# Patient Record
Sex: MALE | Race: WHITE | NOT HISPANIC OR LATINO | Employment: OTHER | URBAN - METROPOLITAN AREA
[De-identification: names, ages, dates, MRNs, and addresses within clinical notes are randomized per-mention and may not be internally consistent; named-entity substitution may affect disease eponyms.]

---

## 2019-02-13 ENCOUNTER — OFFICE VISIT (OUTPATIENT)
Dept: CARDIOLOGY CLINIC | Facility: CLINIC | Age: 74
End: 2019-02-13
Payer: COMMERCIAL

## 2019-02-13 VITALS
HEART RATE: 83 BPM | WEIGHT: 180.8 LBS | DIASTOLIC BLOOD PRESSURE: 60 MMHG | SYSTOLIC BLOOD PRESSURE: 120 MMHG | HEIGHT: 71 IN | OXYGEN SATURATION: 96 % | BODY MASS INDEX: 25.31 KG/M2

## 2019-02-13 DIAGNOSIS — I10 ESSENTIAL HYPERTENSION, BENIGN: ICD-10-CM

## 2019-02-13 DIAGNOSIS — E78.2 MIXED HYPERLIPIDEMIA: ICD-10-CM

## 2019-02-13 DIAGNOSIS — I25.10 CORONARY ARTERY DISEASE INVOLVING NATIVE HEART WITHOUT ANGINA PECTORIS, UNSPECIFIED VESSEL OR LESION TYPE: Primary | ICD-10-CM

## 2019-02-13 PROCEDURE — 93000 ELECTROCARDIOGRAM COMPLETE: CPT | Performed by: INTERNAL MEDICINE

## 2019-02-13 PROCEDURE — 99214 OFFICE O/P EST MOD 30 MIN: CPT | Performed by: INTERNAL MEDICINE

## 2019-02-13 RX ORDER — ATORVASTATIN CALCIUM 10 MG/1
TABLET, FILM COATED ORAL
COMMUNITY
End: 2019-12-10

## 2019-02-13 RX ORDER — FUROSEMIDE 20 MG/1
TABLET ORAL
COMMUNITY
End: 2019-02-13 | Stop reason: ALTCHOICE

## 2019-02-13 RX ORDER — GABAPENTIN 100 MG/1
CAPSULE ORAL
COMMUNITY
End: 2019-02-13 | Stop reason: ALTCHOICE

## 2019-02-13 RX ORDER — EZETIMIBE 10 MG/1
TABLET ORAL EVERY 24 HOURS
COMMUNITY
Start: 2014-12-10 | End: 2019-02-13 | Stop reason: ALTCHOICE

## 2019-02-13 RX ORDER — ASPIRIN 81 MG/1
TABLET, CHEWABLE ORAL DAILY
COMMUNITY
End: 2019-02-13 | Stop reason: ALTCHOICE

## 2019-02-13 RX ORDER — AMLODIPINE BESYLATE 10 MG/1
TABLET ORAL
COMMUNITY
Start: 2017-06-07

## 2019-02-13 RX ORDER — METOPROLOL SUCCINATE 25 MG/1
TABLET, EXTENDED RELEASE ORAL
COMMUNITY
Start: 2016-12-07 | End: 2021-06-28

## 2019-02-13 RX ORDER — ACETAMINOPHEN 160 MG
TABLET,DISINTEGRATING ORAL EVERY 24 HOURS
COMMUNITY
Start: 2017-06-07 | End: 2019-02-13 | Stop reason: ALTCHOICE

## 2019-02-13 RX ORDER — LORAZEPAM 0.5 MG/1
0.5 TABLET ORAL EVERY 6 HOURS PRN
COMMUNITY
Start: 2019-01-27 | End: 2019-02-13 | Stop reason: ALTCHOICE

## 2019-02-13 RX ORDER — CLOBETASOL PROPIONATE 0.5 MG/G
OINTMENT TOPICAL
COMMUNITY
Start: 2018-04-20

## 2019-02-13 RX ORDER — ASCORBIC ACID 100 MG
TABLET,CHEWABLE ORAL
COMMUNITY
End: 2019-02-13 | Stop reason: ALTCHOICE

## 2019-02-13 RX ORDER — CILOSTAZOL 100 MG/1
TABLET ORAL
COMMUNITY
End: 2019-02-13 | Stop reason: ALTCHOICE

## 2019-02-13 RX ORDER — AMLODIPINE BESYLATE 5 MG/1
TABLET ORAL
COMMUNITY
End: 2019-02-13 | Stop reason: ALTCHOICE

## 2019-02-13 RX ORDER — FERROUS SULFATE 325(65) MG
TABLET ORAL 3 TIMES DAILY
COMMUNITY
End: 2019-09-04 | Stop reason: ALTCHOICE

## 2019-02-13 RX ORDER — CEPHALEXIN 500 MG/1
CAPSULE ORAL
COMMUNITY
End: 2019-02-13 | Stop reason: ALTCHOICE

## 2019-02-13 RX ORDER — GABAPENTIN 300 MG/1
CAPSULE ORAL
COMMUNITY
End: 2019-02-13 | Stop reason: ALTCHOICE

## 2019-02-13 RX ORDER — ATORVASTATIN CALCIUM 20 MG/1
TABLET, FILM COATED ORAL EVERY 24 HOURS
COMMUNITY
Start: 2014-12-11 | End: 2019-09-04 | Stop reason: ALTCHOICE

## 2019-02-13 RX ORDER — CIPROFLOXACIN 500 MG/1
TABLET, FILM COATED ORAL
COMMUNITY
End: 2019-02-13 | Stop reason: ALTCHOICE

## 2019-02-13 RX ORDER — ASPIRIN 81 MG/1
TABLET ORAL EVERY 24 HOURS
COMMUNITY
Start: 2015-12-10 | End: 2019-02-13 | Stop reason: ALTCHOICE

## 2019-02-13 RX ORDER — EZETIMIBE 10 MG/1
TABLET ORAL
COMMUNITY
End: 2019-02-13 | Stop reason: ALTCHOICE

## 2019-02-13 NOTE — PROGRESS NOTES
[facility]   [address] [facility], [place] [address]  Phone#  [phone]  Fax#  [phone]  *-*-*-*-*-*-*-*-*-*-*-*-*-*-*-*-*-*-*-*-*-*-*-*-*-*-*-*-*-*-*-*-*-*-*-*-*-*-*-*-*-*-*-*-*-*-*-*-*-*-*-*-*-*    Peter Jean DATE: 02/13/19 1:00 PM    PATIENT NAME: Sanjuanita Grijalva   1945    [id number]  Age: 75 y o  Sex: male    AUTHOR: Josue Laureano MD  PRIMARYCARE PHYSICIAN: Rosangela Adames MD    DIAGNOSES:  1  History of coronary artery disease status post CABG 2011  2  Abdominal aortic aneurysm repair in 2015  3  Peripheral vascular disease status post aortofemoral bypass in July 2016   4  Benign essential hypertension  5  Mixed dyslipidemia  6  Osteoarthritis and degenerative joint disease  7  History of anemia  8  Peripheral neuropathy  9  History of constipation  10  Non-small cell lung cancer involving right Lung upper lobe, stage IIIA, confirmed in December 2018, currently on a 7 week course of chemo (Taxol and carboplatin) and  radiation  Right hilar lymph node metastasis  11  Right inguinal hernia  Persantine nuclear stress test in December 2014 showed normal perfusion with no evidence of ischemia, EF 62%  Echocardiogram in December 2017 showed mild concentric left ventricular hypertrophy, normal left ventricular systolic function, EF around 91-15%, grade 1 diastolic dysfunction with normal left ventricular filling pressures, trace mitral valve regurgitation, aortic valve sclerosis, mild tricuspid valve regurgitation and no pulmonary hypertension  ECG June 2018 showed sinus rhythm, HR 70 BPM, normal axis and intervals, no significant ST T-wave abnormalities  CURRENT ECG:  Results for orders placed or performed in visit on 02/13/19   POCT ECG    Narrative    Sinus rhythm, HR 83 bpm, normal axis and intervals, no significant ST T wave abnormalities         CARDIOLOGY ASSESSMENT & PLAN:  Coronary artery disease involving native heart without angina pectoris  Known history of coronary artery disease and peripheral artery disease status post CABG and aortofemoral bypass  Blood pressure is well controlled  Continues to smoke  Is on reasonable medical regimen  Is noted to be on chemotherapy with carboplatin and Taxol  Has minor side effects but no significant cardiac defects     - Patient is advised to continue current medical therapy  - Dietary and medical compliance are reinforced  - Advised  to report any concerning symptoms such as chest pain, shortness of breath, decline in exercise tolerance or presyncope/syncope  - chemotherapy agents being used are relatively are infrequently associated with cardiac problems including heart failure, blood clots and strokes have been reported with Carboplatin use  He will need surveillance monitoring from time to time  We will repeat echocardiogram prior to next visit  - I again strongly reinforced him about quitting smoking completely  Essential hypertension, benign  Evaluation and plan as noted above  Mixed hyperlipidemia  Evaluation and plan as noted above  INTERVAL HISTORY & HISTORY OF PRESENT ILLNESS:  Marilynn Bustos is here for follow-up regarding his cardiac comorbidities which include:  History of coronary artery disease, peripheral artery disease and comorbidities  He is here for follow-up  Mentions that since his last visit with us in June 2018 he has been diagnosed with non-small cell lung cancer involving the right lung  Review of his records indicate stage IIIA NSCLA with mild mediastinal med ST cyst   He is being treated with chemotherapy and radiation  His chemotherapy is include Taxol and carboplatin  He has been tolerating the chemotherapy and radiation fairly well  Does have some symptoms including some esophageal discomfort and difficulty swallowing cold foods and hair loss  Denies any typical chest pain, orthopnea, palpitations, presyncope/syncope    Also denies claudication symptoms although he feels leg cramps occasionally  No other recent hospitalizations or other illnesses  He continues to smoke half a pack to a pack a day  REVIEW OF SYMPTOMS:    Positive for:  As described above in HPI  Negative for: All remaining as reviewed below and in HPI  SYSTEM SYMPTOMS REVIEWED:  General--weight change, fever, night sweats  Respirato  Cardiovascular--chest pain, syncope, dyspnea on exertion, edema, decline in exercise tolerance, claudication   Gastrointestinal--persistent vomiting, diarrhea, abdominal distention, blood in stool   Muscular or skeletal--joint pain or swelling   Neurologic--headaches, syncope, abnormal movement  Hematologic--history of easy bruising and bleeding   Endocrine--thyroid enlargement, heat or cold intolerance, polyuria   Psychiatric--anxiety, depression     *-*-*-*-*-*-*-*-*-*-*-*-*-*-*-*-*-*-*-*-*-*-*-*-*-*-*-*-*-*-*-*-*-*-*-*-*-*-*-*-*-*-*-*-*-*-*-*-*-*-*-*-*-*-  VITAL SIGNS:  Vitals:    02/13/19 1144   BP: 120/60   Pulse: 83   SpO2: 96%   Weight: 82 kg (180 lb 12 8 oz)   Height: 5' 11" (1 803 m)       *-*-*-*-*-*-*-*-*-*-*-*-*-*-*-*-*-*-*-*-*-*-*-*-*-*-*-*-*-*-*-*-*-*-*-*-*-*-*-*-*-*-*-*-*-*-*-*-*-*-*-*-*-*-  PHYSICAL EXAM:  General Appearance:    Alert, cooperative, no distress, appears stated age   Head, Eyes, ENT:    No obvious abnormality, moist mucous mebranes  Neck:   Supple, no carotid bruit or JVD   Back:     Symmetric, no curvature  Lungs:     Respirations unlabored  Clear to auscultation bilaterally,    Chest wall:    No tenderness or deformity   Heart:    Regular rate and rhythm, S1 and S2 normal, no murmur, rub  or gallop     Abdomen:     Soft, non-tender, No obvious masses, or organomegaly   Extremities:   Extremities normal, no cyanosis or edema    Skin:   Skin color, texture, turgor normal, no rashes or lesions     *-*-*-*-*-*-*-*-*-*-*-*-*-*-*-*-*-*-*-*-*-*-*-*-*-*-*-*-*-*-*-*-*-*-*-*-*-*-*-*-*-*-*-*-*-*-*-*-*-*-*-*-*-*-  CURRENT MEDICATION LIST:    Current Outpatient Medications:     amLODIPine (NORVASC) 10 mg tablet, TAKE ONE TABLET BY MOUTH EVERY DAY, Disp: , Rfl:     atorvastatin (LIPITOR) 10 mg tablet, atorvastatin 10 mg tablet, Disp: , Rfl:     atorvastatin (LIPITOR) 20 mg tablet, every 24 hours, Disp: , Rfl:     clobetasol (TEMOVATE) 0 05 % ointment, Apply to affected area daily as needed, Disp: , Rfl:     ferrous sulfate 325 (65 Fe) mg tablet, 3 (three) times a day, Disp: , Rfl:     metoprolol succinate (TOPROL-XL) 25 mg 24 hr tablet, metoprolol succinate ER 25 mg tablet,extended release 24 hr, Disp: , Rfl:     Multiple Vitamins-Minerals (MULTIVITAMIN ADULT EXTRA C PO), Take 1 capsule by mouth, Disp: , Rfl:     ALLERGIES:  Allergies   Allergen Reactions    Ciprofloxacin      Dark stool , upset stomach    Duloxetine Other (See Comments)     Cramps    Meloxicam      Other reaction(s): itchy, blotchy, stomach upset    Nifedipine     Pentoxifylline      Dark stool, upset stomach    Tramadol Other (See Comments)     Ineffective          *-*-*-*-*-*-*-*-*-*-*-*-*-*-*-*-*-*-*-*-*-*-*-*-*-*-*-*-*-*-*-*-*-*-*-*-*-*-*-*-*-*-*-*-*-*-*-*-*-*-*-*-*-*-    LABORATORY DATA:  I have personally reviewed pertinent labs  Recent blood work from [facility] is reviewed  It is significant for hemoglobin of 10 5, hematocrit of 30 3, platelet 151, normal renal function and electrolytes, low albumin      No results found for: NA, K, CL, CO2, ANIONGAP, BUN, CREATININE, EGFR, GLUCOSE, CALCIUM, AST, ALT, ALKPHOS, PROT, BILITOT, MG  No results found for: WBC, HGB, PLT  No results found for: PT, PTT, INR  No results found for: CKMB, BNP, DIGOXIN  No results found for: TSH  No results found for: CHOL, HDL, LDL, TRIG   No results found for: HGBA1C  No results found for: Sonia Rm, GRAMSTAIN, URINECX, WOUNDCULT, BODYFLUIDCUL, MRSACULTURE, INFLUAPCR, INFLUBPCR, RSVPCR, LEGIONELLAUR, CDIFFTOXINB    *-*-*-*-*-*-*-*-*-*-*-*-*-*-*-*-*-*-*-*-*-*-*-*-*-*-*-*-*-*-*-*-*-*-*-*-*-*-*-*-*-*-*-*-*-*-*-*-*-*-*-*-*-*-  PREVIOUS CARDIOLOGY & RADIOLOGY RESULTS:  No results found for this or any previous visit  No results found for this or any previous visit  No results found for this or any previous visit  No results found for this or any previous visit  Patient was never admitted       *-*-*-*-*-*-*-*-*-*-*-*-*-*-*-*-*-*-*-*-*-*-*-*-*-*-*-*-*-*-*-*-*-*-*-*-*-*-*-*-*-*-*-*-*-*-*-*-*-*-*-*-*-*-  SIGNATURES:   @XUH@   Josue Laureano MD     CC:   Sly Luque MD   Self, Referral   *-*-*-*-*-*-*-*-*-*-*-*-*-*-*-*-*-*-*-*-*-*-*-*-*-*-*-*-*-*-*-*-*-*-*-*-*-*-*-*-*-*-*-*-*-*-*-*-*-*-*-*-*-*-    Social History     Socioeconomic History    Marital status: /Civil Union     Spouse name: Not on file    Number of children: Not on file    Years of education: Not on file    Highest education level: Not on file   Occupational History    Not on file   Social Needs    Financial resource strain: Not on file    Food insecurity:     Worry: Not on file     Inability: Not on file    Transportation needs:     Medical: Not on file     Non-medical: Not on file   Tobacco Use    Smoking status: Current Some Day Smoker     Types: Cigarettes    Smokeless tobacco: Never Used    Tobacco comment: passive smoke exposure   Substance and Sexual Activity    Alcohol use: Not on file    Drug use: Not on file    Sexual activity: Not on file   Lifestyle    Physical activity:     Days per week: Not on file     Minutes per session: Not on file    Stress: Not on file   Relationships    Social connections:     Talks on phone: Not on file     Gets together: Not on file     Attends Christian service: Not on file     Active member of club or organization: Not on file     Attends meetings of clubs or organizations: Not on file     Relationship status: Not on file    Intimate partner violence:     Fear of current or ex partner: Not on file     Emotionally abused: Not on file     Physically abused: Not on file     Forced sexual activity: Not on file   Other Topics Concern    Not on file   Social History Narrative    Not on file      Family History   Problem Relation Age of Onset    Aortic aneurysm Mother     Coronary artery disease Mother      Past Surgical History:   Procedure Laterality Date    FEMORAL BYPASS

## 2019-02-13 NOTE — ASSESSMENT & PLAN NOTE
Known history of coronary artery disease and peripheral artery disease status post CABG and aortofemoral bypass  Blood pressure is well controlled  Continues to smoke  Is on reasonable medical regimen  Is noted to be on chemotherapy with carboplatin and Taxol  Has minor side effects but no significant cardiac defects     - Patient is advised to continue current medical therapy  - Dietary and medical compliance are reinforced  - Advised  to report any concerning symptoms such as chest pain, shortness of breath, decline in exercise tolerance or presyncope/syncope  - chemotherapy agents being used are relatively are infrequently associated with cardiac problems including heart failure, blood clots and strokes have been reported with Carboplatin use  He will need surveillance monitoring from time to time  We will repeat echocardiogram prior to next visit  - I again strongly reinforced him about quitting smoking completely

## 2019-02-13 NOTE — LETTER
February 13, 2019     Referral [facility]    Patient: Lia Larry   YOB: 1945   Date of Visit: 2/13/2019       Dear Dr Manpreet Evans:    Thank you for referring Lia Larry to me for evaluation  Below are my notes for this consultation  If you have questions, please do not hesitate to call me  I look forward to following your patient along with you  Sincerely,        Josue Laureano MD        CC: MD Vickie Traylor MD Ather Mansoor, MD  2/13/2019 12:56 PM  Sign at close encounter   [facility]   [address] [place] [address], [place] [address]  Phone#  [phone]  Fax#  [phone]  *-*-*-*-*-*-*-*-*-*-*-*-*-*-*-*-*-*-*-*-*-*-*-*-*-*-*-*-*-*-*-*-*-*-*-*-*-*-*-*-*-*-*-*-*-*-*-*-*-*-*-*-*-*    Rebeka Bradleyady DATE: 02/13/19 12:56 PM    PATIENT NAME: Miguelina Grijalva   1945    [id number]  Age: 75 y o  Sex: male    AUTHOR: Josue Laureano MD  PRIMARYCARE PHYSICIAN: Zee Bliss MD    DIAGNOSES:  1  History of coronary artery disease status post CABG 2011  2  Abdominal aortic aneurysm repair in 2015  3  Peripheral vascular disease status post aortofemoral bypass in July 2016   4  Benign essential hypertension  5  Mixed dyslipidemia  6  Osteoarthritis and degenerative joint disease  7  History of anemia  8  Peripheral neuropathy  9  History of constipation  10  Non-small cell lung cancer involving right Lung upper lobe, stage IIIA, confirmed in December 2018, currently on a 7 week course of chemo (Taxol and carboplatin) and  radiation  Right hilar lymph node metastasis  11  Right inguinal hernia  Persantine nuclear stress test in December 2014 showed normal perfusion with no evidence of ischemia, EF 62%       Echocardiogram in December 2017 showed mild concentric left ventricular hypertrophy, normal left ventricular systolic function, EF around 61-19%, grade 1 diastolic dysfunction with normal left ventricular filling pressures, trace mitral valve regurgitation, aortic valve sclerosis, mild tricuspid valve regurgitation and no pulmonary hypertension  ECG June 2018 showed sinus rhythm, HR 70 BPM, normal axis and intervals, no significant ST T-wave abnormalities  CURRENT ECG:  No results found for this visit on 02/13/19  CARDIOLOGY ASSESSMENT & PLAN:  Coronary artery disease involving native heart without angina pectoris  Known history of coronary artery disease and peripheral artery disease status post CABG and aortofemoral bypass  Blood pressure is well controlled  Continues to smoke  Is on reasonable medical regimen  Is noted to be on chemotherapy with carboplatin and Taxol  Has minor side effects but no significant cardiac defects     - Patient is advised to continue current medical therapy  - Dietary and medical compliance are reinforced  - Advised  to report any concerning symptoms such as chest pain, shortness of breath, decline in exercise tolerance or presyncope/syncope  - chemotherapy agents being used are relatively are infrequently associated with cardiac problems including heart failure, blood clots and strokes have been reported with Carboplatin use  He will need surveillance monitoring from time to time  We will repeat echocardiogram prior to next visit  - I again strongly reinforced him about quitting smoking completely  Essential hypertension, benign  Evaluation and plan as noted above  Mixed hyperlipidemia  Evaluation and plan as noted above  INTERVAL HISTORY & HISTORY OF PRESENT ILLNESS:  Brandt Oleary is here for follow-up regarding his cardiac comorbidities which include:  History of coronary artery disease, peripheral artery disease and comorbidities  He is here for follow-up  Mentions that since his last visit with us in June 2018 he has been diagnosed with non-small cell lung cancer involving the right lung    Review of his records indicate stage IIIA NSCLA with mild mediastinal med ST cyst   He is being treated with chemotherapy and radiation  His chemotherapy is include Taxol and carboplatin  He has been tolerating the chemotherapy and radiation fairly well  Does have some symptoms including some esophageal discomfort and difficulty swallowing cold foods and hair loss  Denies any typical chest pain, orthopnea, palpitations, presyncope/syncope  Also denies claudication symptoms although he feels leg cramps occasionally  No other recent hospitalizations or other illnesses  He continues to smoke half a pack to a pack a day  REVIEW OF SYMPTOMS:    Positive for:  As described above in HPI  Negative for: All remaining as reviewed below and in HPI  SYSTEM SYMPTOMS REVIEWED:  Generalweight change, fever, night sweats  Respirato  Cardiovascularchest pain, syncope, dyspnea on exertion, edema, decline in exercise tolerance, claudication   Gastrointestinalpersistent vomiting, diarrhea, abdominal distention, blood in stool   Muscular or skeletaljoint pain or swelling   Neurologicheadaches, syncope, abnormal movement  Hematologichistory of easy bruising and bleeding   Endocrinethyroid enlargement, heat or cold intolerance, polyuria   Psychiatricanxiety, depression     *-*-*-*-*-*-*-*-*-*-*-*-*-*-*-*-*-*-*-*-*-*-*-*-*-*-*-*-*-*-*-*-*-*-*-*-*-*-*-*-*-*-*-*-*-*-*-*-*-*-*-*-*-*-  VITAL SIGNS:  Vitals:    02/13/19 1144   BP: 120/60   Pulse: 83   SpO2: 96%   Weight: 82 kg (180 lb 12 8 oz)   Height: 5' 11" (1 803 m)       *-*-*-*-*-*-*-*-*-*-*-*-*-*-*-*-*-*-*-*-*-*-*-*-*-*-*-*-*-*-*-*-*-*-*-*-*-*-*-*-*-*-*-*-*-*-*-*-*-*-*-*-*-*-  PHYSICAL EXAM:  General Appearance:    Alert, cooperative, no distress, appears stated age   Head, Eyes, ENT:    No obvious abnormality, moist mucous mebranes  Neck:   Supple, no carotid bruit or JVD   Back:     Symmetric, no curvature  Lungs:     Respirations unlabored   Clear to auscultation bilaterally,    Chest wall:    No tenderness or deformity   Heart: Regular rate and rhythm, S1 and S2 normal, no murmur, rub  or gallop  Abdomen:     Soft, non-tender, No obvious masses, or organomegaly   Extremities:   Extremities normal, no cyanosis or edema    Skin:   Skin color, texture, turgor normal, no rashes or lesions     *-*-*-*-*-*-*-*-*-*-*-*-*-*-*-*-*-*-*-*-*-*-*-*-*-*-*-*-*-*-*-*-*-*-*-*-*-*-*-*-*-*-*-*-*-*-*-*-*-*-*-*-*-*-  CURRENT MEDICATION LIST:    Current Outpatient Medications:     amLODIPine (NORVASC) 10 mg tablet, TAKE ONE TABLET BY MOUTH EVERY DAY, Disp: , Rfl:     atorvastatin (LIPITOR) 10 mg tablet, atorvastatin 10 mg tablet, Disp: , Rfl:     atorvastatin (LIPITOR) 20 mg tablet, every 24 hours, Disp: , Rfl:     clobetasol (TEMOVATE) 0 05 % ointment, Apply to affected area daily as needed, Disp: , Rfl:     ferrous sulfate 325 (65 Fe) mg tablet, 3 (three) times a day, Disp: , Rfl:     metoprolol succinate (TOPROL-XL) 25 mg 24 hr tablet, metoprolol succinate ER 25 mg tablet,extended release 24 hr, Disp: , Rfl:     Multiple Vitamins-Minerals (MULTIVITAMIN ADULT EXTRA C PO), Take 1 capsule by mouth, Disp: , Rfl:     ALLERGIES:  Allergies   Allergen Reactions    Ciprofloxacin      Dark stool , upset stomach    Duloxetine Other (See Comments)     Cramps    Meloxicam      Other reaction(s): itchy, blotchy, stomach upset    Nifedipine     Pentoxifylline      Dark stool, upset stomach    Tramadol Other (See Comments)     Ineffective          *-*-*-*-*-*-*-*-*-*-*-*-*-*-*-*-*-*-*-*-*-*-*-*-*-*-*-*-*-*-*-*-*-*-*-*-*-*-*-*-*-*-*-*-*-*-*-*-*-*-*-*-*-*-    LABORATORY DATA:  I have personally reviewed pertinent labs  Recent blood work from [facility] is reviewed  It is significant for hemoglobin of 10 5, hematocrit of 30 3, platelet 507, normal renal function and electrolytes, low albumin      No results found for: NA, K, CL, CO2, ANIONGAP, BUN, CREATININE, EGFR, GLUCOSE, CALCIUM, AST, ALT, ALKPHOS, PROT, BILITOT, MG  No results found for: WBC, HGB, PLT  No results found for: PT, PTT, INR  No results found for: CKMB, BNP, DIGOXIN  No results found for: TSH  No results found for: CHOL, HDL, LDL, TRIG   No results found for: HGBA1C  No results found for: BLOODCX, SPUTUMCULTUR, GRAMSTAIN, URINECX, WOUNDCULT, BODYFLUIDCUL, MRSACULTURE, INFLUAPCR, INFLUBPCR, RSVPCR, LEGIONELLAUR, CDIFFTOXINB    *-*-*-*-*-*-*-*-*-*-*-*-*-*-*-*-*-*-*-*-*-*-*-*-*-*-*-*-*-*-*-*-*-*-*-*-*-*-*-*-*-*-*-*-*-*-*-*-*-*-*-*-*-*-  PREVIOUS CARDIOLOGY & RADIOLOGY RESULTS:  No results found for this or any previous visit  No results found for this or any previous visit  No results found for this or any previous visit  No results found for this or any previous visit  Patient was never admitted       *-*-*-*-*-*-*-*-*-*-*-*-*-*-*-*-*-*-*-*-*-*-*-*-*-*-*-*-*-*-*-*-*-*-*-*-*-*-*-*-*-*-*-*-*-*-*-*-*-*-*-*-*-*-  SIGNATURES:   @OPZ@   Josue Laureano MD     CC:   Jourdan Ojeda MD   Self, Referral   *-*-*-*-*-*-*-*-*-*-*-*-*-*-*-*-*-*-*-*-*-*-*-*-*-*-*-*-*-*-*-*-*-*-*-*-*-*-*-*-*-*-*-*-*-*-*-*-*-*-*-*-*-*-    Social History     Socioeconomic History    Marital status: /Civil Union     Spouse name: Not on file    Number of children: Not on file    Years of education: Not on file    Highest education level: Not on file   Occupational History    Not on file   Social Needs    Financial resource strain: Not on file    Food insecurity:     Worry: Not on file     Inability: Not on file    Transportation needs:     Medical: Not on file     Non-medical: Not on file   Tobacco Use    Smoking status: Current Some Day Smoker     Types: Cigarettes    Smokeless tobacco: Never Used    Tobacco comment: passive smoke exposure   Substance and Sexual Activity    Alcohol use: Not on file    Drug use: Not on file    Sexual activity: Not on file   Lifestyle    Physical activity:     Days per week: Not on file     Minutes per session: Not on file    Stress: Not on file   Relationships    Social connections:     Talks on phone: Not on file     Gets together: Not on file     Attends Sikhism service: Not on file     Active member of club or organization: Not on file     Attends meetings of clubs or organizations: Not on file     Relationship status: Not on file    Intimate partner violence:     Fear of current or ex partner: Not on file     Emotionally abused: Not on file     Physically abused: Not on file     Forced sexual activity: Not on file   Other Topics Concern    Not on file   Social History Narrative    Not on file      Family History   Problem Relation Age of Onset    Aortic aneurysm Mother     Coronary artery disease Mother      Past Surgical History:   Procedure Laterality Date    FEMORAL BYPASS

## 2019-02-13 NOTE — PATIENT INSTRUCTIONS
CARDIOLOGY ASSESSMENT & PLAN:  Coronary artery disease involving native heart without angina pectoris  Known history of coronary artery disease and peripheral artery disease status post CABG and aortofemoral bypass  Blood pressure is well controlled  Continues to smoke  Is on reasonable medical regimen  Is noted to be on chemotherapy with carboplatin and Taxol  Has minor side effects but no significant cardiac defects     - Patient is advised to continue current medical therapy  - Dietary and medical compliance are reinforced  - Advised  to report any concerning symptoms such as chest pain, shortness of breath, decline in exercise tolerance or presyncope/syncope  - chemotherapy agents being used are relatively are infrequently associated with cardiac problems including heart failure, blood clots and strokes have been reported with Carboplatin use  He will need surveillance monitoring from time to time  We will repeat echocardiogram prior to next visit  - I again strongly reinforced him about quitting smoking completely  Essential hypertension, benign  Evaluation and plan as noted above  Mixed hyperlipidemia  Evaluation and plan as noted above  DIAGNOSES:  1  History of coronary artery disease status post CABG 2011  2  Abdominal aortic aneurysm repair in 2015  3  Peripheral vascular disease status post aortofemoral bypass in July 2016   4  Benign essential hypertension  5  Mixed dyslipidemia  6  Osteoarthritis and degenerative joint disease  7  History of anemia  8  Peripheral neuropathy  9  History of constipation  10  Non-small cell lung cancer involving right Lung upper lobe, stage IIIA, confirmed in December 2018, currently on a 7 week course of chemo (Taxol and carboplatin) and  radiation  Right hilar lymph node metastasis    11  Right inguinal hernia       Persantine nuclear stress test in December 2014 showed normal perfusion with no evidence of ischemia, EF 62%       Echocardiogram in December 2017 showed mild concentric left ventricular hypertrophy, normal left ventricular systolic function, EF around 10-95%, grade 1 diastolic dysfunction with normal left ventricular filling pressures, trace mitral valve regurgitation, aortic valve sclerosis, mild tricuspid valve regurgitation and no pulmonary hypertension      ECG June 2018 showed sinus rhythm, HR 70 BPM, normal axis and intervals, no significant ST T-wave abnormalities

## 2019-09-04 ENCOUNTER — OFFICE VISIT (OUTPATIENT)
Dept: CARDIOLOGY CLINIC | Facility: CLINIC | Age: 74
End: 2019-09-04
Payer: COMMERCIAL

## 2019-09-04 VITALS
HEIGHT: 71 IN | DIASTOLIC BLOOD PRESSURE: 60 MMHG | WEIGHT: 169 LBS | BODY MASS INDEX: 23.66 KG/M2 | OXYGEN SATURATION: 97 % | SYSTOLIC BLOOD PRESSURE: 120 MMHG | HEART RATE: 74 BPM

## 2019-09-04 DIAGNOSIS — I25.10 CORONARY ARTERY DISEASE INVOLVING NATIVE CORONARY ARTERY OF NATIVE HEART WITHOUT ANGINA PECTORIS: Primary | ICD-10-CM

## 2019-09-04 DIAGNOSIS — I10 ESSENTIAL HYPERTENSION, BENIGN: ICD-10-CM

## 2019-09-04 DIAGNOSIS — Z01.810 PRE-OPERATIVE CARDIOVASCULAR EXAMINATION: ICD-10-CM

## 2019-09-04 PROCEDURE — 99214 OFFICE O/P EST MOD 30 MIN: CPT | Performed by: INTERNAL MEDICINE

## 2019-09-04 PROCEDURE — 3074F SYST BP LT 130 MM HG: CPT | Performed by: INTERNAL MEDICINE

## 2019-09-04 PROCEDURE — 3078F DIAST BP <80 MM HG: CPT | Performed by: INTERNAL MEDICINE

## 2019-09-04 RX ORDER — DESOXIMETASONE 2.5 MG/G
CREAM TOPICAL 2 TIMES DAILY
COMMUNITY
End: 2021-06-28

## 2019-09-04 NOTE — PATIENT INSTRUCTIONS
CARDIOLOGY ASSESSMENT & PLAN:  1  Coronary artery disease involving native coronary artery of native heart without angina pectoris     2  Essential hypertension, benign     3  Pre-operative cardiovascular examination       Coronary artery disease involving native heart without angina pectoris  History of coronary artery disease with remote history of CABG  No recent symptoms of angina  However he continues to smoke and is not interested in smoking cessation  Blood pressure is well controlled  - Patient is advised to continue current medical therapy  - Dietary and medical compliance are reinforced  - Advised  to report any concerning symptoms such as chest pain, shortness of breath, decline in exercise tolerance or presyncope/syncope  - reinforced the importance of quitting smoking and offered to help if he is agreeable  Essential hypertension, benign  Blood pressure is well controlled on current regimen  Will continue current therapy  Pre-operative cardiovascular examination  Is being considered for hernia repair surgery, a procedure with low inherent cardiac risk  Appears to be well compensated with no signs and symptoms of heart failure  No recent angina  Physical examination is no suggests significant valvular heart disease  Recent ECG overall unremarkable  Exercise tolerance is fair  May proceed with planned surgery with low cardiac risk  Does have history of lung cancer and radiation treatment and will need close oxygenation and hemodynamic monitoring during the procedure  DIAGNOSES:  1  History of coronary artery disease status post CABG 2011  2  Abdominal aortic aneurysm repair in 2015  3  Peripheral vascular disease status post aortofemoral bypass in July 2016   4  Benign essential hypertension  5  Mixed dyslipidemia  6  Osteoarthritis and degenerative joint disease  7  History of anemia  8  Peripheral neuropathy  9  History of constipation  10   Non-small cell lung cancer involving right Lung upper lobe, stage IIIA, confirmed in December 2018, completed 7 week course of chemo (Taxol and carboplatin) and radiation- March 2019  Right hilar lymph node metastasis  11  Right inguinal hernia  Persantine nuclear stress test in December 2014 showed normal perfusion with no evidence of ischemia, EF 62%  Echocardiogram in December 2017 showed mild concentric left ventricular hypertrophy, normal left ventricular systolic function, EF around 23-93%, grade 1 diastolic dysfunction with normal left ventricular filling pressures, trace mitral valve regurgitation, aortic valve sclerosis, mild tricuspid valve regurgitation and no pulmonary hypertension  ECG June 2018 showed sinus rhythm, HR 70 BPM, normal axis and intervals, no significant ST T-wave abnormalities

## 2019-09-04 NOTE — ASSESSMENT & PLAN NOTE
Is being considered for hernia repair surgery, a procedure with low inherent cardiac risk  Appears to be well compensated with no signs and symptoms of heart failure  No recent angina  Physical examination is no suggests significant valvular heart disease  Recent ECG overall unremarkable  Exercise tolerance is fair  May proceed with planned surgery with low cardiac risk  Does have history of lung cancer and radiation treatment and will need close oxygenation and hemodynamic monitoring during the procedure

## 2019-09-04 NOTE — ASSESSMENT & PLAN NOTE
History of coronary artery disease with remote history of CABG  No recent symptoms of angina  However he continues to smoke and is not interested in smoking cessation  Blood pressure is well controlled  - Patient is advised to continue current medical therapy  - Dietary and medical compliance are reinforced  - Advised  to report any concerning symptoms such as chest pain, shortness of breath, decline in exercise tolerance or presyncope/syncope  - reinforced the importance of quitting smoking and offered to help if he is agreeable

## 2019-09-04 NOTE — PROGRESS NOTES
[facility]  [address] [facility], [place] [address]  Phone#  [phone]  Fax#  [phone]  *-*-*-*-*-*-*-*-*-*-*-*-*-*-*-*-*-*-*-*-*-*-*-*-*-*-*-*-*-*-*-*-*-*-*-*-*-*-*-*-*-*-*-*-*-*-*-*-*-*-*-*-*-*  Chevy Spence DATE: 09/04/19 3:26 PM  PATIENT NAME: Elbert Grijalva   1945    [id number]  Age: 75 y o  Sex: male  AUTHOR: Josue Laureano MD  PRIMARYCARE PHYSICIAN: Caro Porter MD    DIAGNOSES:  1  History of coronary artery disease status post CABG 2011  2  Abdominal aortic aneurysm repair in 2015  3  Peripheral vascular disease status post aortofemoral bypass in July 2016   4  Benign essential hypertension  5  Mixed dyslipidemia  6  Osteoarthritis and degenerative joint disease  7  History of anemia  8  Peripheral neuropathy  9  History of constipation  10  Non-small cell lung cancer involving right Lung upper lobe, stage IIIA, confirmed in December 2018, completed 7 week course of chemo (Taxol and carboplatin) and radiation- March 2019  Right hilar lymph node metastasis  11  Right inguinal hernia  Persantine nuclear stress test in December 2014 showed normal perfusion with no evidence of ischemia, EF 62%  Echocardiogram in December 2017 showed mild concentric left ventricular hypertrophy, normal left ventricular systolic function, EF around 16-69%, grade 1 diastolic dysfunction with normal left ventricular filling pressures, trace mitral valve regurgitation, aortic valve sclerosis, mild tricuspid valve regurgitation and no pulmonary hypertension  ECG June 2018 showed sinus rhythm, HR 70 BPM, normal axis and intervals, no significant ST T-wave abnormalities  CURRENT ECG:  No results found for this visit on 09/04/19  ECG performed recently on 08/20 2/2019 at [facility] indicates normal sinus rhythm, HR 69 beats per minute, normal axis and intervals, no significant ST T-wave abnormalities  CARDIOLOGY ASSESSMENT & PLAN:  1   Coronary artery disease involving native coronary artery of native heart without angina pectoris     2  Essential hypertension, benign     3  Pre-operative cardiovascular examination       Coronary artery disease involving native heart without angina pectoris  History of coronary artery disease with remote history of CABG  No recent symptoms of angina  However he continues to smoke and is not interested in smoking cessation  Blood pressure is well controlled  - Patient is advised to continue current medical therapy  - Dietary and medical compliance are reinforced  - Advised  to report any concerning symptoms such as chest pain, shortness of breath, decline in exercise tolerance or presyncope/syncope  - reinforced the importance of quitting smoking and offered to help if he is agreeable  Essential hypertension, benign  Blood pressure is well controlled on current regimen  Will continue current therapy  Pre-operative cardiovascular examination  Is being considered for hernia repair surgery, a procedure with low inherent cardiac risk  Appears to be well compensated with no signs and symptoms of heart failure  No recent angina  Physical examination is no suggests significant valvular heart disease  Recent ECG overall unremarkable  Exercise tolerance is fair  May proceed with planned surgery with low cardiac risk  Does have history of lung cancer and radiation treatment and will need close oxygenation and hemodynamic monitoring during the procedure  INTERVAL HISTORY & HISTORY OF PRESENT ILLNESS:  Kirsten Lesches is here for follow-up regarding his cardiac comorbidities which include:  History of coronary artery disease status post CABG, hypertension, peripheral artery disease and other comorbidities  He is here for follow-up  He reports that he has been overall feeling well from cardiac perspective    He he is scheduled to undergo bilateral inguinal hernia repair surgery at [facility] later this month and is requesting cardiac clearance  From a symptom perspective he has been overall all right  He mentions that he is in remission from his cancer and finished chemotherapy  He is going to be placed on immuno therapy following his hernia repair surgery  He denies unusual chest pain, shortness of breath, palpitations, orthopnea, PND or pedal edema  He does mention of having intermittent cough  Has had no recent hospitalizations or other illnesses  Continues to smoke  Continues to experience symptoms of claudication  REVIEW OF SYMPTOMS:    Positive for:  Intermittent cough, some dysphagia which is better now, claudication symptoms  Negative for: All remaining as reviewed below and in HPI  SYSTEM SYMPTOMS REVIEWED:  General--weight change, fever, night sweats  Respiratory--cough, wheezing, shortness of breath, sputum production  Cardiovascular--chest pain, syncope, dyspnea on exertion, edema, decline in exercise tolerance, claudication   Gastrointestinal--persistent vomiting, diarrhea, abdominal distention, blood in stool   Muscular or skeletal--joint pain or swelling   Neurologic--headaches, syncope, abnormal movement  Hematologic--history of easy bruising and bleeding   Endocrine--thyroid enlargement, heat or cold intolerance, polyuria   Psychiatric--anxiety, depression     *-*-*-*-*-*-*-*-*-*-*-*-*-*-*-*-*-*-*-*-*-*-*-*-*-*-*-*-*-*-*-*-*-*-*-*-*-*-*-*-*-*-*-*-*-*-*-*-*-*-*-*-*-*-  VITAL SIGNS:  Vitals:    09/04/19 1455   BP: 120/60   Pulse: 74   SpO2: 97%   Weight: 76 7 kg (169 lb)   Height: 5' 11" (1 803 m)     Weight is down by 11 lb compared to last visit in February 2019  *-*-*-*-*-*-*-*-*-*-*-*-*-*-*-*-*-*-*-*-*-*-*-*-*-*-*-*-*-*-*-*-*-*-*-*-*-*-*-*-*-*-*-*-*-*-*-*-*-*-*-*-*-*-  PHYSICAL EXAM:  General Appearance:    Alert, cooperative, no distress, appears stated age   Head, Eyes, ENT:    No obvious abnormality, moist mucous mebranes     Neck:   Supple, no carotid bruit or JVD   Back: Symmetric, no curvature  Lungs:     Respirations unlabored  breath sounds are coarse bilaterally with bronchial breath and expiratory rhonchi  Chest wall:     has chemotherapy port in left subclavian region   Heart:    Regular rate and rhythm, S1 and S2 normal, no murmur, rub  or gallop  Abdomen:     Soft, non-tender, No obvious masses, or organomegaly   Extremities:   Extremities normal, no cyanosis or edema    Skin:   Skin color, texture, turgor normal, no rashes or lesions     *-*-*-*-*-*-*-*-*-*-*-*-*-*-*-*-*-*-*-*-*-*-*-*-*-*-*-*-*-*-*-*-*-*-*-*-*-*-*-*-*-*-*-*-*-*-*-*-*-*-*-*-*-*-  CURRENT MEDICATION LIST:    Current Outpatient Medications:     amLODIPine (NORVASC) 10 mg tablet, TAKE ONE TABLET BY MOUTH EVERY DAY, Disp: , Rfl:     atorvastatin (LIPITOR) 10 mg tablet, atorvastatin 10 mg tablet, Disp: , Rfl:     clobetasol (TEMOVATE) 0 05 % ointment, Apply to affected area daily as needed, Disp: , Rfl:     desoximetasone (TOPICORT) 0 25 % cream, Apply topically 2 (two) times a day, Disp: , Rfl:     metoprolol succinate (TOPROL-XL) 25 mg 24 hr tablet, metoprolol succinate ER 25 mg tablet,extended release 24 hr, Disp: , Rfl:     Multiple Vitamins-Minerals (MULTIVITAMIN ADULT EXTRA C PO), Take 1 capsule by mouth, Disp: , Rfl:     ALLERGIES:  Allergies   Allergen Reactions    Ciprofloxacin      Dark stool , upset stomach    Duloxetine Other (See Comments)     Cramps    Meloxicam      Other reaction(s): itchy, blotchy, stomach upset    Nifedipine     Pentoxifylline      Dark stool, upset stomach    Tramadol Other (See Comments)     Ineffective          *-*-*-*-*-*-*-*-*-*-*-*-*-*-*-*-*-*-*-*-*-*-*-*-*-*-*-*-*-*-*-*-*-*-*-*-*-*-*-*-*-*-*-*-*-*-*-*-*-*-*-*-*-*-  The LABORATORY DATA:  I have personally reviewed pertinent labs            No results found for: K, CL, CO2, ANIONGAP, BUN, CREATININE, EGFR, GLUCOSE, CALCIUM, AST, ALT, ALKPHOS, PROT, BILITOT, MG  No results found for: WBC, HGB, PLT  No results found for: PT, PTT, INR  No results found for: CKMB, DIGOXIN  No results found for: TSH  No results found for: CHOL, HDL, LDL, TRIG   No results found for: HGBA1C  No results found for: Charlaine Spring, GRAMSTAIN, URINECX, WOUNDCULT, BODYFLUIDCUL, MRSACULTURE, INFLUAPCR, INFLUBPCR, RSVPCR, LEGIONELLAUR, CDIFFTOXINB    *-*-*-*-*-*-*-*-*-*-*-*-*-*-*-*-*-*-*-*-*-*-*-*-*-*-*-*-*-*-*-*-*-*-*-*-*-*-*-*-*-*-*-*-*-*-*-*-*-*-*-*-*-*-  PREVIOUS CARDIOLOGY & RADIOLOGY RESULTS:  No results found for this or any previous visit  No results found for this or any previous visit  No results found for this or any previous visit  No results found for this or any previous visit    MRA runoff  1 3 46 314010 11 14818 5 0 1508 [id number]  CT outside images  2 16 840 1 863522 1 11 [id number]  MRA runoff  1 3 46 547566 11 15862 5 0 15023 [id number]  CT outside images  2 16 840 1 327326 1 11 [id number]  XR chest portable  2 16 840 1 182309 1 11 [id number]  XR chest portable  1 79 837 9 010557 9 66 [id number]  US abdominal aorta  2 16 840 1 860628 1 11 [id number]  NM cardiac blood pool muga (at rest)  2 16 840 1 181212 1 11 [id number]  US abdominal aorta  2 16 840 1 878935 1 11 [id number]  US abdominal aorta  2 16 840 1 751995 1 11 [id number]  US abdominal aorta  2 16 840 1 166711 1 11 [id number]  US abdominal aorta  2 16 840 1 185601 1 11 [id number]  XR chest pa & lateral  2 16 840 1 193078 1 11 [id number]  US abdominal aorta  2 16 840 1 408357 1 11 [id number]        *-*-*-*-*-*-*-*-*-*-*-*-*-*-*-*-*-*-*-*-*-*-*-*-*-*-*-*-*-*-*-*-*-*-*-*-*-*-*-*-*-*-*-*-*-*-*-*-*-*-*-*-*-*-  SIGNATURES:   @LIZ@   Josue Laureano MD *-*-*-*-*-*-*-*-*-*-*-*-*-*-*-*-*-*-*-*-*-*-*-*-*-*-*-*-*-*-*-*-*-*-*-*-*-*-*-*-*-*-*-*-*-*-*-*-*-*-*-*-*-*-    Social History     Socioeconomic History    Marital status: /Civil Union     Spouse name: Not on file    Number of children: Not on file    Years of education: Not on file    Highest education level: Not on file   Occupational History    Not on file   Social Needs    Financial resource strain: Not on file    Food insecurity:     Worry: Not on file     Inability: Not on file    Transportation needs:     Medical: Not on file     Non-medical: Not on file   Tobacco Use    Smoking status: Current Some Day Smoker     Types: Cigarettes    Smokeless tobacco: Never Used    Tobacco comment: passive smoke exposure   Substance and Sexual Activity    Alcohol use: Not on file    Drug use: Not on file    Sexual activity: Not on file   Lifestyle    Physical activity:     Days per week: Not on file     Minutes per session: Not on file    Stress: Not on file   Relationships    Social connections:     Talks on phone: Not on file     Gets together: Not on file     Attends Religion service: Not on file     Active member of club or organization: Not on file     Attends meetings of clubs or organizations: Not on file     Relationship status: Not on file    Intimate partner violence:     Fear of current or ex partner: Not on file     Emotionally abused: Not on file     Physically abused: Not on file     Forced sexual activity: Not on file   Other Topics Concern    Not on file   Social History Narrative    Not on file      Family History   Problem Relation Age of Onset    Aortic aneurysm Mother     Coronary artery disease Mother      Past Surgical History:   Procedure Laterality Date    FEMORAL BYPASS

## 2019-12-10 ENCOUNTER — OFFICE VISIT (OUTPATIENT)
Dept: CARDIOLOGY CLINIC | Facility: CLINIC | Age: 74
End: 2019-12-10
Payer: COMMERCIAL

## 2019-12-10 VITALS
HEART RATE: 78 BPM | HEIGHT: 71 IN | SYSTOLIC BLOOD PRESSURE: 109 MMHG | DIASTOLIC BLOOD PRESSURE: 60 MMHG | WEIGHT: 161 LBS | BODY MASS INDEX: 22.54 KG/M2

## 2019-12-10 DIAGNOSIS — I69.398 CVA, OLD, HOMONYMOUS HEMIANOPSIA: ICD-10-CM

## 2019-12-10 DIAGNOSIS — H53.469 CVA, OLD, HOMONYMOUS HEMIANOPSIA: ICD-10-CM

## 2019-12-10 DIAGNOSIS — E78.2 MIXED HYPERLIPIDEMIA: Primary | ICD-10-CM

## 2019-12-10 DIAGNOSIS — I10 ESSENTIAL HYPERTENSION, BENIGN: ICD-10-CM

## 2019-12-10 DIAGNOSIS — I25.10 CORONARY ARTERY DISEASE INVOLVING NATIVE CORONARY ARTERY OF NATIVE HEART WITHOUT ANGINA PECTORIS: ICD-10-CM

## 2019-12-10 PROCEDURE — 3078F DIAST BP <80 MM HG: CPT | Performed by: INTERNAL MEDICINE

## 2019-12-10 PROCEDURE — 3074F SYST BP LT 130 MM HG: CPT | Performed by: INTERNAL MEDICINE

## 2019-12-10 PROCEDURE — 99215 OFFICE O/P EST HI 40 MIN: CPT | Performed by: INTERNAL MEDICINE

## 2019-12-10 RX ORDER — ATORVASTATIN CALCIUM 40 MG/1
40 TABLET, FILM COATED ORAL DAILY
Qty: 90 TABLET | Refills: 5 | Status: SHIPPED | OUTPATIENT
Start: 2019-12-10 | End: 2020-12-29

## 2019-12-10 RX ORDER — METOPROLOL SUCCINATE 25 MG/1
TABLET, EXTENDED RELEASE ORAL
COMMUNITY
Start: 2019-11-20

## 2019-12-10 RX ORDER — ATORVASTATIN CALCIUM 20 MG/1
TABLET, FILM COATED ORAL
COMMUNITY
Start: 2019-11-27 | End: 2019-12-10

## 2019-12-10 NOTE — ASSESSMENT & PLAN NOTE
Blood pressure is well controlled  - Patient is advised to continue current medical therapy  - Dietary and medical compliance are reinforced  - Advised  to report any concerning symptoms such as chest pain, shortness of breath, decline in exercise tolerance or presyncope/syncope

## 2019-12-10 NOTE — ASSESSMENT & PLAN NOTE
76-year-old gentleman with known history of CAD and peripheral artery disease with history of CABG and continued risk factors of smoking for now noted to have CVA  At this time I do not have complete information regarding the diagnosis and workup and recommendations from his ophthalmologist   He is noted to have prominent bruit in the right carotid region  He continues to smoke and does not intend to quit smoking     - at this time we are increasing the dose of his atorvastatin to 40 mg once daily  - will request for information regarding diagnosis and plan for evaluation and treatment from his ophthalmologist's office     - will obtain copies of his last carotid ultrasound  - recommend detail evaluation and treatment for cerebrovascular disease  Would like to have opinion if dual antiplatelet therapy would be more suitable for him given extent of his disease     - as he has not had any lipid profile done recently we recommend a complete lipid profile evaluation with next blood work  - I have again strongly urged him regarding cessation of smoking however he is not amenable to this at this time  - will continue current antihypertensive therapy

## 2019-12-10 NOTE — PROGRESS NOTES
[facility]  [address] [facility], [place] [address]  Phone#  [phone]  Fax#  [phone]  *-*-*-*-*-*-*-*-*-*-*-*-*-*-*-*-*-*-*-*-*-*-*-*-*-*-*-*-*-*-*-*-*-*-*-*-*-*-*-*-*-*-*-*-*-*-*-*-*-*-*-*-*-*  Jacques Hoosta DATE: 12/10/19 6:32 PM  PATIENT NAME: Belén Grijalva   1945    [id number]  Age: 75 y o  Sex: male  AUTHOR: Josue Laureano MD  PRIMARYCARE PHYSICIAN: Jeremiah Cline MD    DIAGNOSES:  1  History of coronary artery disease status post CABG 2011  2  Abdominal aortic aneurysm repair in 2015  3  Peripheral vascular disease status post aortofemoral bypass in July 2016   4  Benign essential hypertension  5  Mixed dyslipidemia  6  Osteoarthritis and degenerative joint disease  7  History of anemia  8  Peripheral neuropathy  9  History of constipation  10  Non-small cell lung cancer involving right Lung upper lobe, stage IIIA, confirmed in December 2018, completed 7 week course of chemo (Taxol and carboplatin) and radiation- March 2019  Right hilar lymph node metastasis  11  Bilateral inguinal hernia s/p repair Sep 2019  12  Recent R eye stroke  Persantine nuclear stress test in December 2014 showed normal perfusion with no evidence of ischemia, EF 62%  Echocardiogram in December 2017 showed mild concentric left ventricular hypertrophy, normal left ventricular systolic function, EF around 37-23%, grade 1 diastolic dysfunction with normal left ventricular filling pressures, trace mitral valve regurgitation, aortic valve sclerosis, mild tricuspid valve regurgitation and no pulmonary hypertension  ECG June 2018 showed sinus rhythm, HR 70 BPM, normal axis and intervals, no significant ST T-wave abnormalities  CURRENT ECG:  No results found for this visit on 12/10/19  CARDIOLOGY ASSESSMENT & PLAN:  1  Mixed hyperlipidemia  atorvastatin (LIPITOR) 40 mg tablet   2  CVA, old, homonymous hemianopsia     3  Essential hypertension, benign     4   Coronary artery disease involving native coronary artery of native heart without angina pectoris       CVA, old, homonymous hemianopsia  42-year-old gentleman with known history of CAD and peripheral artery disease with history of CABG and continued risk factors of smoking for now noted to have CVA  At this time I do not have complete information regarding the diagnosis and workup and recommendations from his ophthalmologist   He is noted to have prominent bruit in the right carotid region  He continues to smoke and does not intend to quit smoking     - at this time we are increasing the dose of his atorvastatin to 40 mg once daily  - will request for information regarding diagnosis and plan for evaluation and treatment from his ophthalmologist's office     - will obtain copies of his last carotid ultrasound  - recommend detail evaluation and treatment for cerebrovascular disease  Would like to have opinion if dual antiplatelet therapy would be more suitable for him given extent of his disease     - as he has not had any lipid profile done recently we recommend a complete lipid profile evaluation with next blood work  - I have again strongly urged him regarding cessation of smoking however he is not amenable to this at this time  - will continue current antihypertensive therapy  Essential hypertension, benign  Blood pressure is well controlled  - Patient is advised to continue current medical therapy  - Dietary and medical compliance are reinforced  - Advised  to report any concerning symptoms such as chest pain, shortness of breath, decline in exercise tolerance or presyncope/syncope  Coronary artery disease involving native heart without angina pectoris  This condition is stable  Plan as noted above        INTERVAL HISTORY & HISTORY OF PRESENT ILLNESS:  Nash Siemens is here for follow-up regarding his cardiac comorbidities which include:  Coronary artery disease with history of CABG in 2011, history of abdominal aortic aneurysm repair, peripheral artery disease status post aortofemoral bypass in 2016  Patient is here for follow-up  Mentions that he has recently been diagnosed with stroke involving his right eye region  Apparently he was having visual changes with blurriness of vision and disfigurement of images  He was evaluated by [facility] ophthalmology and as per him he was diagnosed with a stroke  Currently I do not have data regarding  this diagnosis  He has not undergone any neurologic workup as per information  He has followed up with his vascular surgeon Dr Jaida Mccain and apparently underwent carotid artery scans which apparently showed 50% occlusion  He has been started on aspirin therapy  Currently his visual deficit involves central vision in the right eye with intact peripheral vision  He denies any other focal neurologic motor or sensory deficit  From a cardiac perspective no recent chest pain or shortness of breath or palpitations reported  He continues to smoke about pack of cigarettes a day  He mentions that he has finished all chemotherapy and radiation therapy with respect to his right lung cancer  His last CT scan on October 18th demonstrated interval decrease in size of the mass in the right upper lobe with probable mild pneumonitis in right upper lobe  He was noted to have indiscernible metastatic disease in the right hilum and right lower paratracheal region  There was no evidence of new meta stasis in the chest     REVIEW OF SYMPTOMS:    Positive for:  Visual field defect in the right eye  Negative for: All remaining as reviewed below and in HPI      SYSTEM SYMPTOMS REVIEWED:  General--weight change, fever, night sweats  Respiratory--cough, wheezing, shortness of breath, sputum production  Cardiovascular--chest pain, syncope, dyspnea on exertion, edema, decline in exercise tolerance, claudication   Gastrointestinal--persistent vomiting, diarrhea, abdominal distention, blood in stool   Muscular or skeletal--joint pain or swelling   Neurologic--headaches, syncope, abnormal movement  Hematologic--history of easy bruising and bleeding   Endocrine--thyroid enlargement, heat or cold intolerance, polyuria   Psychiatric--anxiety, depression     *-*-*-*-*-*-*-*-*-*-*-*-*-*-*-*-*-*-*-*-*-*-*-*-*-*-*-*-*-*-*-*-*-*-*-*-*-*-*-*-*-*-*-*-*-*-*-*-*-*-*-*-*-*-  VITAL SIGNS:  Vitals:    12/10/19 1758   BP: 109/60   Pulse: 78   Weight: 73 kg (161 lb)   Height: 5' 11" (1 803 m)     Weight (last 2 days)     Date/Time   Weight    12/10/19 1758   73 (161)           ,   Wt Readings from Last 3 Encounters:   12/10/19 73 kg (161 lb)   09/04/19 76 7 kg (169 lb)   02/13/19 82 kg (180 lb 12 8 oz)    , Body mass index is 22 45 kg/m²  Weight is decreased by 8 lb compared to last visit in September 2019  *-*-*-*-*-*-*-*-*-*-*-*-*-*-*-*-*-*-*-*-*-*-*-*-*-*-*-*-*-*-*-*-*-*-*-*-*-*-*-*-*-*-*-*-*-*-*-*-*-*-*-*-*-*-  PHYSICAL EXAM:  General Appearance:    Alert, cooperative, no distress, appears stated age, there is evidence of weight loss and loss of muscle mass   Head, Eyes, ENT:    No obvious abnormality, moist mucous mebranes  Neck:   Supple, there is there is prominent bruit in the right carotid artery  There is no JVD  Back:     Symmetric, no curvature  Lungs:     Respirations unlabored  Clear to auscultation bilaterally,    Chest wall:     left subclavian chemotherapy port is noted  Heart:    Regular rate and rhythm, S1 and S2 normal, no murmur, rub  or gallop     Abdomen:     Soft, non-tender, No obvious masses, or organomegaly   Extremities:   Extremities normal, no cyanosis or edema    Skin:   Skin color, texture, turgor normal, no rashes or lesions     *-*-*-*-*-*-*-*-*-*-*-*-*-*-*-*-*-*-*-*-*-*-*-*-*-*-*-*-*-*-*-*-*-*-*-*-*-*-*-*-*-*-*-*-*-*-*-*-*-*-*-*-*-*-  CURRENT MEDICATION LIST:    Current Outpatient Medications:     amLODIPine (NORVASC) 10 mg tablet, TAKE ONE TABLET BY MOUTH EVERY DAY, Disp: , Rfl:     aspirin 81 MG tablet, Take 81 mg by mouth daily, Disp: , Rfl:     clobetasol (TEMOVATE) 0 05 % ointment, Apply to affected area daily as needed, Disp: , Rfl:     desoximetasone (TOPICORT) 0 25 % cream, Apply topically 2 (two) times a day, Disp: , Rfl:     metoprolol succinate (TOPROL-XL) 25 mg 24 hr tablet, metoprolol succinate ER 25 mg tablet,extended release 24 hr, Disp: , Rfl:     metoprolol succinate (TOPROL-XL) 25 mg 24 hr tablet, TAKE ONE TABLET BY MOUTH EVERY DAY, Disp: , Rfl:     atorvastatin (LIPITOR) 40 mg tablet, Take 1 tablet (40 mg total) by mouth daily, Disp: 90 tablet, Rfl: 5    Multiple Vitamins-Minerals (MULTIVITAMIN ADULT EXTRA C PO), Take 1 capsule by mouth, Disp: , Rfl:     ALLERGIES:  Allergies   Allergen Reactions    Ciprofloxacin      Dark stool , upset stomach    Duloxetine Other (See Comments)     Cramps    Meloxicam      Other reaction(s): itchy, blotchy, stomach upset    Nifedipine     Pentoxifylline      Dark stool, upset stomach    Tramadol Other (See Comments)     Ineffective          *-*-*-*-*-*-*-*-*-*-*-*-*-*-*-*-*-*-*-*-*-*-*-*-*-*-*-*-*-*-*-*-*-*-*-*-*-*-*-*-*-*-*-*-*-*-*-*-*-*-*-*-*-*-  The LABORATORY DATA:  I have personally reviewed pertinent labs  Blood work from 14th October at [facility] work is noted  GFR is 52, potassium was 4 4  Thyroid function in August was normal   There has been no recent lipid testing since 2018            No results found for: K, CL, CO2, ANIONGAP, BUN, CREATININE, EGFR, GLUCOSE, CALCIUM, AST, ALT, ALKPHOS, PROT, BILITOT, MG  No results found for: WBC, HGB, PLT  No results found for: PT, PTT, INR  No results found for: CKMB, DIGOXIN  No results found for: TSH  No results found for: CHOL, HDL, LDL, TRIG   No results found for: HGBA1C  No results found for: Timothy Caulk, GRAMSTAIN, URINECX, WOUNDCULT, BODYFLUIDCUL, MRSACULTURE, INFLUAPCR, INFLUBPCR, RSVPCR, LEGIONELLAUR, CDIFFTOXINB    *-*-*-*-*-*-*-*-*-*-*-*-*-*-*-*-*-*-*-*-*-*-*-*-*-*-*-*-*-*-*-*-*-*-*-*-*-*-*-*-*-*-*-*-*-*-*-*-*-*-*-*-*-*-  PREVIOUS CARDIOLOGY & RADIOLOGY RESULTS:  No results found for this or any previous visit  No results found for this or any previous visit  No results found for this or any previous visit  No results found for this or any previous visit    MRA runoff  1 3 46 299701 11 90419 5 0 1508 [id number]  CT outside images  2 16 840 1 333431 1 11 [id number]  MRA runoff  1 3 46 673228 11 26834 5 0 03474 [id number]  CT outside images  2 16 840 1 421620 1 11 [id number]  XR chest portable  3 77 622 0 550582 4 38 [id number]  XR chest portable  7 74 815 3 883451 8 69 [id number]  US abdominal aorta  2 16 840 1 600271 1 11 [id number]  NM cardiac blood pool muga (at rest)  2 16 840 1 367094 1 11 [id number]  US abdominal aorta  2 16 840 1 695067 1 11 [id number]  US abdominal aorta  2 16 840 1 745972 1 11 [id number]  US abdominal aorta  2 16 840 1 042404 1 11 [id number]  US abdominal aorta  2 16 840 1 269402 1 11 [id number]  XR chest pa & lateral  2 16 840 1 500315 1 11 [id number]  US abdominal aorta  2 16 840 1 008251 1 11 [id number]        *-*-*-*-*-*-*-*-*-*-*-*-*-*-*-*-*-*-*-*-*-*-*-*-*-*-*-*-*-*-*-*-*-*-*-*-*-*-*-*-*-*-*-*-*-*-*-*-*-*-*-*-*-*-  SIGNATURES:   @VKY@   Josue Laureano MD     *-*-*-*-*-*-*-*-*-*-*-*-*-*-*-*-*-*-*-*-*-*-*-*-*-*-*-*-*-*-*-*-*-*-*-*-*-*-*-*-*-*-*-*-*-*-*-*-*-*-*-*-*-*-    Social History     Socioeconomic History    Marital status: /Civil Union     Spouse name: Not on file    Number of children: Not on file    Years of education: Not on file    Highest education level: Not on file   Occupational History    Not on file   Social Needs    Financial resource strain: Not on file    Food insecurity:     Worry: Not on file     Inability: Not on file    Transportation needs:     Medical: Not on file     Non-medical: Not on file   Tobacco Use    Smoking status: Current Some Day Smoker     Types: Cigarettes    Smokeless tobacco: Never Used    Tobacco comment: passive smoke exposure   Substance and Sexual Activity    Alcohol use: Not on file    Drug use: Not on file    Sexual activity: Not on file   Lifestyle    Physical activity:     Days per week: Not on file     Minutes per session: Not on file    Stress: Not on file   Relationships    Social connections:     Talks on phone: Not on file     Gets together: Not on file     Attends Evangelical service: Not on file     Active member of club or organization: Not on file     Attends meetings of clubs or organizations: Not on file     Relationship status: Not on file    Intimate partner violence:     Fear of current or ex partner: Not on file     Emotionally abused: Not on file     Physically abused: Not on file     Forced sexual activity: Not on file   Other Topics Concern    Not on file   Social History Narrative    Not on file      Family History   Problem Relation Age of Onset    Aortic aneurysm Mother     Coronary artery disease Mother      Past Surgical History:   Procedure Laterality Date    FEMORAL BYPASS

## 2019-12-10 NOTE — PATIENT INSTRUCTIONS
CARDIOLOGY ASSESSMENT & PLAN:  1  Mixed hyperlipidemia  atorvastatin (LIPITOR) 40 mg tablet   2  CVA, old, homonymous hemianopsia     3  Essential hypertension, benign     4  Coronary artery disease involving native coronary artery of native heart without angina pectoris       CVA, old, homonymous hemianopsia  70-year-old gentleman with known history of CAD and peripheral artery disease with history of CABG and continued risk factors of smoking for now noted to have CVA  At this time I do not have complete information regarding the diagnosis and workup and recommendations from his ophthalmologist   He is noted to have prominent bruit in the right carotid region  He continues to smoke and does not intend to quit smoking     - at this time we are increasing the dose of his atorvastatin to 40 mg once daily  - will request for information regarding diagnosis and plan for evaluation and treatment from his ophthalmologist's office     - will obtain copies of his last carotid ultrasound  - recommend detail evaluation and treatment for cerebrovascular disease  Would like to have opinion if dual antiplatelet therapy would be more suitable for him given extent of his disease     - as he has not had any lipid profile done recently we recommend a complete lipid profile evaluation with next blood work  - I have again strongly urged him regarding cessation of smoking however he is not amenable to this at this time  - will continue current antihypertensive therapy  Essential hypertension, benign  Blood pressure is well controlled  - Patient is advised to continue current medical therapy  - Dietary and medical compliance are reinforced  - Advised  to report any concerning symptoms such as chest pain, shortness of breath, decline in exercise tolerance or presyncope/syncope  Coronary artery disease involving native heart without angina pectoris  This condition is stable  Plan as noted above

## 2019-12-23 DIAGNOSIS — H53.469 HOMONYMOUS HEMIANOPSIA DUE TO RECENT CEREBROVASCULAR ACCIDENT (CVA): Primary | ICD-10-CM

## 2019-12-23 DIAGNOSIS — I69.398 HOMONYMOUS HEMIANOPSIA DUE TO RECENT CEREBROVASCULAR ACCIDENT (CVA): Primary | ICD-10-CM

## 2019-12-23 NOTE — PROGRESS NOTES
Received call from patient's vascular surgeon regarding patient's recent homonymous hemianopsia which is suspected due to embolic phenomenon  He has carotid artery disease with maximum of 50-79% stenosis  Given his risk factors there is concern for atrial fibrillation though patient has denied experiencing any palpitations  - will arrange for 3 week event monitor to evaluate for paroxysmal atrial fibrillation

## 2019-12-24 ENCOUNTER — TELEPHONE (OUTPATIENT)
Dept: CARDIOLOGY CLINIC | Facility: CLINIC | Age: 74
End: 2019-12-24

## 2019-12-24 NOTE — TELEPHONE ENCOUNTER
----- Message from Thais Bernard MD sent at 12/23/2019 10:14 AM EST -----  Please arrange for 3 week event monitor to evaluate for paroxysmal atrial fibrillation in the setting of recent stroke involving the eye  Please also call patient's ophthalmologist to get information about his recent evaluation for stroke involving the eye  Thank you

## 2020-06-22 ENCOUNTER — OFFICE VISIT (OUTPATIENT)
Dept: CARDIOLOGY CLINIC | Facility: CLINIC | Age: 75
End: 2020-06-22
Payer: COMMERCIAL

## 2020-06-22 VITALS
DIASTOLIC BLOOD PRESSURE: 58 MMHG | TEMPERATURE: 99.2 F | WEIGHT: 167 LBS | HEART RATE: 60 BPM | SYSTOLIC BLOOD PRESSURE: 110 MMHG | BODY MASS INDEX: 23.29 KG/M2

## 2020-06-22 DIAGNOSIS — I25.10 CORONARY ARTERY DISEASE WITHOUT ANGINA PECTORIS, UNSPECIFIED VESSEL OR LESION TYPE, UNSPECIFIED WHETHER NATIVE OR TRANSPLANTED HEART: Primary | ICD-10-CM

## 2020-06-22 DIAGNOSIS — I25.10 CORONARY ARTERY DISEASE INVOLVING NATIVE CORONARY ARTERY OF NATIVE HEART WITHOUT ANGINA PECTORIS: ICD-10-CM

## 2020-06-22 DIAGNOSIS — H53.469 CVA, OLD, HOMONYMOUS HEMIANOPSIA: ICD-10-CM

## 2020-06-22 DIAGNOSIS — I10 ESSENTIAL HYPERTENSION, BENIGN: ICD-10-CM

## 2020-06-22 DIAGNOSIS — I69.398 CVA, OLD, HOMONYMOUS HEMIANOPSIA: ICD-10-CM

## 2020-06-22 DIAGNOSIS — E78.2 MIXED HYPERLIPIDEMIA: ICD-10-CM

## 2020-06-22 PROCEDURE — 93000 ELECTROCARDIOGRAM COMPLETE: CPT | Performed by: INTERNAL MEDICINE

## 2020-06-22 PROCEDURE — 99214 OFFICE O/P EST MOD 30 MIN: CPT | Performed by: INTERNAL MEDICINE

## 2020-12-22 ENCOUNTER — OFFICE VISIT (OUTPATIENT)
Dept: CARDIOLOGY CLINIC | Facility: CLINIC | Age: 75
End: 2020-12-22
Payer: COMMERCIAL

## 2020-12-22 VITALS
WEIGHT: 172 LBS | DIASTOLIC BLOOD PRESSURE: 62 MMHG | RESPIRATION RATE: 16 BRPM | BODY MASS INDEX: 24.08 KG/M2 | TEMPERATURE: 97.5 F | SYSTOLIC BLOOD PRESSURE: 118 MMHG | HEIGHT: 71 IN | HEART RATE: 76 BPM

## 2020-12-22 DIAGNOSIS — I25.10 CORONARY ARTERY DISEASE INVOLVING NATIVE CORONARY ARTERY OF NATIVE HEART WITHOUT ANGINA PECTORIS: Primary | ICD-10-CM

## 2020-12-22 DIAGNOSIS — E78.2 MIXED HYPERLIPIDEMIA: ICD-10-CM

## 2020-12-22 DIAGNOSIS — I10 ESSENTIAL HYPERTENSION, BENIGN: ICD-10-CM

## 2020-12-22 DIAGNOSIS — H53.469 CVA, OLD, HOMONYMOUS HEMIANOPSIA: ICD-10-CM

## 2020-12-22 DIAGNOSIS — I69.398 CVA, OLD, HOMONYMOUS HEMIANOPSIA: ICD-10-CM

## 2020-12-22 PROCEDURE — 99214 OFFICE O/P EST MOD 30 MIN: CPT | Performed by: INTERNAL MEDICINE

## 2020-12-28 DIAGNOSIS — E78.2 MIXED HYPERLIPIDEMIA: ICD-10-CM

## 2020-12-29 RX ORDER — ATORVASTATIN CALCIUM 40 MG/1
TABLET, FILM COATED ORAL
Qty: 90 TABLET | Refills: 0 | Status: SHIPPED | OUTPATIENT
Start: 2020-12-29 | End: 2021-03-31

## 2021-01-22 DIAGNOSIS — Z23 ENCOUNTER FOR IMMUNIZATION: ICD-10-CM

## 2021-03-31 DIAGNOSIS — E78.2 MIXED HYPERLIPIDEMIA: ICD-10-CM

## 2021-03-31 RX ORDER — ATORVASTATIN CALCIUM 40 MG/1
TABLET, FILM COATED ORAL
Qty: 90 TABLET | Refills: 0 | Status: SHIPPED | OUTPATIENT
Start: 2021-03-31

## 2021-06-22 ENCOUNTER — HOSPITAL ENCOUNTER (OUTPATIENT)
Dept: NON INVASIVE DIAGNOSTICS | Facility: CLINIC | Age: 76
Discharge: HOME | End: 2021-06-22
Payer: COMMERCIAL

## 2021-06-22 DIAGNOSIS — E78.2 MIXED HYPERLIPIDEMIA: ICD-10-CM

## 2021-06-22 DIAGNOSIS — I10 ESSENTIAL HYPERTENSION, BENIGN: ICD-10-CM

## 2021-06-22 DIAGNOSIS — I25.10 CORONARY ARTERY DISEASE INVOLVING NATIVE CORONARY ARTERY OF NATIVE HEART WITHOUT ANGINA PECTORIS: ICD-10-CM

## 2021-06-22 PROCEDURE — 93306 TTE W/DOPPLER COMPLETE: CPT

## 2021-06-24 PROCEDURE — 93306 TTE W/DOPPLER COMPLETE: CPT | Performed by: INTERNAL MEDICINE

## 2021-06-28 ENCOUNTER — OFFICE VISIT (OUTPATIENT)
Dept: CARDIOLOGY CLINIC | Facility: CLINIC | Age: 76
End: 2021-06-28
Payer: COMMERCIAL

## 2021-06-28 VITALS
WEIGHT: 178 LBS | SYSTOLIC BLOOD PRESSURE: 130 MMHG | BODY MASS INDEX: 24.83 KG/M2 | HEART RATE: 80 BPM | DIASTOLIC BLOOD PRESSURE: 60 MMHG

## 2021-06-28 DIAGNOSIS — E78.2 MIXED HYPERLIPIDEMIA: ICD-10-CM

## 2021-06-28 DIAGNOSIS — I25.10 CORONARY ARTERY DISEASE INVOLVING NATIVE CORONARY ARTERY OF NATIVE HEART WITHOUT ANGINA PECTORIS: ICD-10-CM

## 2021-06-28 DIAGNOSIS — I10 ESSENTIAL HYPERTENSION, BENIGN: ICD-10-CM

## 2021-06-28 DIAGNOSIS — I69.398 CVA, OLD, HOMONYMOUS HEMIANOPSIA: ICD-10-CM

## 2021-06-28 DIAGNOSIS — F17.210 TOBACCO DEPENDENCE DUE TO CIGARETTES: Primary | ICD-10-CM

## 2021-06-28 DIAGNOSIS — I27.20 MILD PULMONARY HYPERTENSION (HCC): ICD-10-CM

## 2021-06-28 DIAGNOSIS — H53.469 CVA, OLD, HOMONYMOUS HEMIANOPSIA: ICD-10-CM

## 2021-06-28 DIAGNOSIS — I51.89 DIASTOLIC DYSFUNCTION WITHOUT HEART FAILURE: ICD-10-CM

## 2021-06-28 PROCEDURE — 99214 OFFICE O/P EST MOD 30 MIN: CPT | Performed by: INTERNAL MEDICINE

## 2021-06-28 NOTE — ASSESSMENT & PLAN NOTE
Mr Festus Patel is overall doing well from cardiac perspective with no recent symptoms that suggest decompensated heart failure angina  Blood pressure is reasonably well controlled  On examination there is no evidence of central peripheral vascular congestion significant valvular heart disease  Echocardiogram done last week is significant for diastolic dysfunction, mild aortic valve calcification without stenosis or regurgitation and mild pulmonary hypertension  -- at this time we will continue his current medications  -- unfortunately he continues to smoke and has no intention quitting smoking this time  Encouraged him that this is a single major risk factor for future cardiovascular events risk of cancer  He understands  -- I am advising him to make sure that he has a follow-up with his oncologists and radiation oncologist regarding monitored office cancer    For

## 2021-06-28 NOTE — PATIENT INSTRUCTIONS
CARDIOLOGY ASSESSMENT & PLAN:  1  Tobacco dependence due to cigarettes     2  Mixed hyperlipidemia     3  Diastolic dysfunction without heart failure     4  CVA, old, homonymous hemianopsia     5  Mild pulmonary hypertension ([address] )     6  Essential hypertension, benign     7  Coronary artery disease involving native coronary artery of native heart without angina pectoris       Coronary artery disease involving native heart without angina pectoris  Mr Shady Gonzalez is overall doing well from cardiac perspective with no recent symptoms that suggest decompensated heart failure angina  Blood pressure is reasonably well controlled  On examination there is no evidence of central peripheral vascular congestion significant valvular heart disease  Echocardiogram done last week is significant for diastolic dysfunction, mild aortic valve calcification without stenosis or regurgitation and mild pulmonary hypertension  -- at this time we will continue his current medications  -- unfortunately he continues to smoke and has no intention quitting smoking this time  Encouraged him that this is a single major risk factor for future cardiovascular events risk of cancer  He understands  -- I am advising him to make sure that he has a follow-up with his oncologists and radiation oncologist regarding monitored office cancer

## 2021-06-28 NOTE — PROGRESS NOTES
[facility]  [address] [facility], [place]  Phone#  [phone]  Fax#  [phone]  *-*-*-*-*-*-*-*-*-*-*-*-*-*-*-*-*-*-*-*-*-*-*-*-*-*-*-*-*-*-*-*-*-*-*-*-*-*-*-*-*-*-*-*-*-*-*-*-*-*-*-*-*-*  Otoniel Factor DATE: 06/28/21 10:58 AM  PATIENT NAME: Gladys Grijalva   1945    [id number]  Age: 67 y o  Sex: male  AUTHOR: Josue Laureano MD  PRIMARYCARE PHYSICIAN: Dwight Bell MD    DIAGNOSES:   1  History of coronary artery disease status post CABG 2011   2  Abdominal aortic aneurysm repair in 2015   3  Peripheral vascular disease status post aortofemoral bypass in July 2016   4  Benign essential hypertension   5  Mixed dyslipidemia   6  Osteoarthritis and degenerative joint disease   7  History of anemia   8  Peripheral neuropathy   9  History of constipation   10  Non-small cell lung cancer involving right Lung upper lobe, stage IIIA, confirmed in December 2018, completed 7 week course of chemo (Taxol and carboplatin) and radiation- March 2019  Right hilar lymph node metastasis  11  Bilateral inguinal hernia s/p repair Sep 2019  12  Carotid artery disease   13  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, with persistent central visual field loss in right eye  12  Recent R eye stroke  ECHOCARDIOGRAM June 22, 2021:  Normal left ventricular size and systolic function, EF around 83%, grade 1 diastolic dysfunction, normal right ventricular size and systolic function, normal left and right atrial size, mild mitral valve regurgitation, aortic valve sclerosis with some focal calcification, no stenosis or regurgitation, mild tricuspid valve regurgitation, mild pulmonary hypertension, estimated RVSP/PASP is 40 mmHg, no obvious pericardial effusion  Borderline active see of ascending thoracic aorta measuring 3 7 cm  17 day EVENT MONITORING in December 2019-January 2020 showed predominant sinus rhythm with no occurrence of atrial fibrillation   Supraventricular and ventricular ectopy burden was less than 1%  There was single occurrence of asymptomatic nonsustained SVT  No symptoms were reported during the period of monitoring  PERSANTINE NUCLEAR STRESS TEST in December 2014 showed normal perfusion with no evidence of ischemia, EF 62%  ECHOCARDIOGRAM in December 2017 showed mild concentric left ventricular hypertrophy, normal left ventricular systolic function, EF around 53-61%, grade 1 diastolic dysfunction with normal left ventricular filling pressures, trace mitral valve regurgitation, aortic valve sclerosis, mild tricuspid valve regurgitation and no pulmonary hypertension  I a IVC you a 8 marked is    CURRENT ECG:  No results found for this visit on 06/28/21  CARDIOLOGY ASSESSMENT & PLAN:  1  Tobacco dependence due to cigarettes     2  Mixed hyperlipidemia     3  Diastolic dysfunction without heart failure     4  CVA, old, homonymous hemianopsia     5  Mild pulmonary hypertension ([address] )     6  Essential hypertension, benign     7  Coronary artery disease involving native coronary artery of native heart without angina pectoris       Coronary artery disease involving native heart without angina pectoris  Mr Mcclellan Blood is overall doing well from cardiac perspective with no recent symptoms that suggest decompensated heart failure angina  Blood pressure is reasonably well controlled  On examination there is no evidence of central peripheral vascular congestion significant valvular heart disease  Echocardiogram done last week is significant for diastolic dysfunction, mild aortic valve calcification without stenosis or regurgitation and mild pulmonary hypertension  -- at this time we will continue his current medications  -- unfortunately he continues to smoke and has no intention quitting smoking this time  Encouraged him that this is a single major risk factor for future cardiovascular events risk of cancer  He understands    -- I am advising him to make sure that he has a follow-up with his oncologists and radiation oncologist regarding monitored office cancer  For      INTERVAL HISTORY & HISTORY OF PRESENT ILLNESS:  Tamika Lau is here for follow-up regarding his cardiac comorbidities which include: Coronary artery disease with history of CABG, history of AAA repair, peripheral artery disease, carotid artery disease, CVA and chronic tobacco dependence  He has been overall feeling well with no recent subjective cardiac symptoms  He was experiencing pleuritic pain in the right chest wall region  He was noted to have right-sided pleural effusion and underwent thoracentesis on 18th of May with removal of 750 cc of clear fluid  This made the symptom go away  He is over 2 years from completion of chemotherapy and radiation for his lung cancer  He declined maintenance therapy  There have been no recent active symptoms of chest discomfort or exertional angina  There is no dyspnea with usual activities or exertion  No orthopnea, PND or pedal edema  No palpitations, lightheadedness, presyncope, or syncope  Reports being compliant with medications  Does get occasional tingling and burning sensation in lower extremities  Unfortunately continues to smoke a pack of cigarettes a day  Denies significant alcohol use  Is fairly active  Cirrhosis or    REVIEW OF SYMPTOMS:    Positive for:  As noted above in HPI  Negative for: All remaining as reviewed below and in HPI      SYSTEM SYMPTOMS REVIEWED:  General--weight change, fever, night sweats  Respiratory--cough, wheezing, shortness of breath, sputum production  Cardiovascular--chest pain, syncope, dyspnea on exertion, edema, decline in exercise tolerance, claudication   Gastrointestinal--persistent vomiting, diarrhea, abdominal distention, blood in stool   Muscular or skeletal--joint pain or swelling   Neurologic--headaches, syncope, abnormal movement  Hematologic--history of easy bruising and bleeding Endocrine--thyroid enlargement, heat or cold intolerance, polyuria   Psychiatric--anxiety, depression     *-*-*-*-*-*-*-*-*-*-*-*-*-*-*-*-*-*-*-*-*-*-*-*-*-*-*-*-*-*-*-*-*-*-*-*-*-*-*-*-*-*-*-*-*-*-*-*-*-*-*-*-*-*-  VITAL SIGNS:  Vitals:    06/28/21 1033   BP: 130/60   BP Location: Left arm   Patient Position: Sitting   Cuff Size: Adult   Pulse: 80   Weight: 80 7 kg (178 lb)     Weight (last 2 days)     Date/Time   Weight    06/28/21 1033   80 7 (178)           ,   Wt Readings from Last 3 Encounters:   06/28/21 80 7 kg (178 lb)   12/22/20 78 kg (172 lb)   06/22/20 75 8 kg (167 lb)    , Body mass index is 24 83 kg/m²  *-*-*-*-*-*-*-*-*-*-*-*-*-*-*-*-*-*-*-*-*-*-*-*-*-*-*-*-*-*-*-*-*-*-*-*-*-*-*-*-*-*-*-*-*-*-*-*-*-*-*-*-*-*-  PHYSICAL EXAM:  General Appearance:    Alert, cooperative, no distress, appears stated age   Head, Eyes, ENT:    No obvious abnormality, moist mucous mebranes  Neck:   Supple, no carotid bruit or JVD   Back:     Symmetric, no curvature  Lungs:     Respirations unlabored  Clear to auscultation bilaterally,    Chest wall:    No tenderness or deformity   Heart:    Regular rate and rhythm, S1 and S2 normal, no murmur, rub  or gallop     Abdomen:     Soft, non-tender, No obvious masses, or organomegaly   Extremities:   Extremities warm, no cyanosis or edema    Skin:   Skin color, texture, turgor normal, no rashes or lesions     *-*-*-*-*-*-*-*-*-*-*-*-*-*-*-*-*-*-*-*-*-*-*-*-*-*-*-*-*-*-*-*-*-*-*-*-*-*-*-*-*-*-*-*-*-*-*-*-*-*-*-*-*-*-  CURRENT MEDICATION LIST:    Current Outpatient Medications:     amLODIPine (NORVASC) 10 mg tablet, TAKE ONE TABLET BY MOUTH EVERY DAY, Disp: , Rfl:     aspirin 81 MG tablet, Take 81 mg by mouth daily, Disp: , Rfl:     atorvastatin (LIPITOR) 40 mg tablet, TAKE ONE TABLET BY MOUTH EVERY DAY , Disp: 90 tablet, Rfl: 0    metoprolol succinate (TOPROL-XL) 25 mg 24 hr tablet, TAKE ONE TABLET BY MOUTH EVERY DAY, Disp: , Rfl:     Multiple Vitamins-Minerals (MULTIVITAMIN ADULT EXTRA C PO), Take 1 capsule by mouth, Disp: , Rfl:     clobetasol (TEMOVATE) 0 05 % ointment, Apply to affected area daily as needed, Disp: , Rfl:     ALLERGIES:  Allergies   Allergen Reactions    Ciprofloxacin      Dark stool , upset stomach    Duloxetine Other (See Comments)     Cramps    Meloxicam      Other reaction(s): itchy, blotchy, stomach upset    Nifedipine     Pentoxifylline      Dark stool, upset stomach    Tramadol Other (See Comments)     Ineffective          *-*-*-*-*-*-*-*-*-*-*-*-*-*-*-*-*-*-*-*-*-*-*-*-*-*-*-*-*-*-*-*-*-*-*-*-*-*-*-*-*-*-*-*-*-*-*-*-*-*-*-*-*-*-  The LABORATORY DATA:  I have personally reviewed pertinent labs      No results found for: NA  No results found for: K, CL, CO2, ANIONGAP, BUN, CREATININE, EGFR, GLUCOSE, CALCIUM, AST, ALT, ALKPHOS, PROT, BILITOT, MG  No results found for: WBC, HGB, PLT  No results found for: PT, PTT, INR  No results found for: CKMB, DIGOXIN  No results found for: TSH  No results found for: CHOL   Hemoglobin A1C   Date Value Ref Range Status   03/23/2021 6 0 (H) <5 7 % Final     Comment:     Reference Range  Non-diabetic                     <5 7  Pre-diabetic                     5 7-6 4  Diabetic                         >=6 5  ADA target for diabetic control  <=7     No results found for: Yung Crane, GRAMSTAIN, URINECX, WOUNDCULT, BODYFLUIDCUL, MRSACULTURE, INFLUAPCR, INFLUBPCR, RSVPCR, LEGIONELLAUR, CDIFFTOXINB    *-*-*-*-*-*-*-*-*-*-*-*-*-*-*-*-*-*-*-*-*-*-*-*-*-*-*-*-*-*-*-*-*-*-*-*-*-*-*-*-*-*-*-*-*-*-*-*-*-*-*-*-*-*-  PREVIOUS CARDIOLOGY & RADIOLOGY RESULTS:  Results for orders placed during the hospital encounter of 06/22/21    Echo complete with contrast if indicated    Narrative  [facility] [address]    Transthoracic Echocardiogram  2D, M-mode, Doppler, and Color Doppler    Study date:  22-Jun-2021    Patient: Gómez Newberry  MR number: [id number]  Account number: [de-identified]  : 1945  Age: 67 years  Gender: Male  Status: Outpatient  Location: [facility]  Height: 71 in  Weight: 171 6 lb  BP: 118/ 62 mmHg    Indications: Coronary artery disease    Diagnoses: I25 10 - Atherosclerotic heart disease of native coronary artery without angina pectoris    Sonographer:  GAYLE Molina  Primary Physician:  Clarine Boas, MD  Referring Physician:  Opal Stovall MD  Group:  Ashleigh Hendricks  Interpreting Physician:  [facility],     SUMMARY    LEFT VENTRICLE:  Size was at the upper limits of normal   Systolic function was normal  Ejection fraction was estimated to be 55 %  There were no regional wall motion abnormalities  Doppler parameters were consistent with abnormal left ventricular relaxation (grade 1 diastolic dysfunction)  RIGHT VENTRICLE:  The size was normal   Systolic function was normal     MITRAL VALVE:  There was mild regurgitation  TRICUSPID VALVE:  There was mild regurgitation  Estimated peak PA pressure was 40 mmHg assuming an estimated right atrial pressure of 5-8 mmHg  PULMONIC VALVE:  There was trace regurgitation  AORTA:  The root measures 3 7 cm  The ascending aorta measures 3 6 cm     COMPARISONS:  Prior study 2017 is not available for comparison  HISTORY: PRIOR HISTORY: CAD,CABG,CVA,Smoker    PROCEDURE: The study was performed in the [facility]  This was a routine study  The transthoracic approach was used  The study included complete 2D imaging, M-mode, complete spectral Doppler, and color Doppler  The heart rate was  70 bpm, at the start of the study  Images were obtained from the parasternal, apical, subcostal, and suprasternal notch acoustic windows  Image quality was adequate  LEFT VENTRICLE: Size was at the upper limits of normal  Systolic function was normal  Ejection fraction was estimated to be 55 %  There were no regional wall motion abnormalities   Boni Ortiz thickness was normal  DOPPLER: Doppler parameters were  consistent with abnormal left ventricular relaxation (grade 1 diastolic dysfunction)  RIGHT VENTRICLE: The size was normal  Systolic function was normal     LEFT ATRIUM: Size was normal     RIGHT ATRIUM: Size was normal     MITRAL VALVE: There was annular calcification  Valve structure was normal  There was normal leaflet separation  DOPPLER: The transmitral velocity was within the normal range  There was no evidence for stenosis  There was mild  regurgitation  AORTIC VALVE: The valve was trileaflet  Leaflets exhibited mildly increased thickness, mild calcification, normal cuspal separation, and sclerosis  DOPPLER: Transaortic velocity was within the normal range  There was no evidence for  stenosis  There was no significant regurgitation  TRICUSPID VALVE: The valve structure was normal  There was normal leaflet separation  DOPPLER: The transtricuspid velocity was within the normal range  There was no evidence for stenosis  There was mild regurgitation  Estimated peak PA  pressure was 40 mmHg assuming an estimated right atrial pressure of 5-8 mmHg  PULMONIC VALVE: Not well visualized  DOPPLER: The transpulmonic velocity was within the normal range  There was trace regurgitation  PERICARDIUM: There was no pericardial effusion  AORTA: The root measures 3 7 cm  The ascending aorta measures 3 6 cm  SYSTEMIC VEINS: IVC: The inferior vena cava was normal in size   Respirophasic changes were normal     SYSTEM MEASUREMENT TABLES    2D  %FS: 28 05 %  Ao Diam: 3 66 cm  Ao asc: 3 55 cm  EDV(Teich): 125 27 ml  EF(Teich): 53 96 %  ESV(Teich): 57 68 ml  IVSd: 0 97 cm  LA Diam: 3 32 cm  LAAs A4C: 17 23 cm2  LAESV A-L A4C: 50 93 ml  LAESV MOD A4C: 42 8 ml  LALs A4C: 4 95 cm  LVEDV MOD A4C: 75 63 ml  LVEF MOD A4C: 57 82 %  LVESV MOD A4C: 31 9 ml  LVIDd: 5 13 cm  LVIDs: 3 69 cm  LVLd A4C: 8 1 cm  LVLs A4C: 6 73 cm  LVPWd: 0 78 cm  RAAs A4C: 18 14 cm2  RAESV A-L: 53 42 ml  RAESV MOD: 54 77 ml  RALs: 5 23 cm  RVIDd: 3 47 cm  SV MOD A4C: 43 73 ml  SV(Teich): 67 6 ml    CW  TR Vmax: 2 88 m/s  TR maxP 21 mmHg    MM  TAPSE: 2 25 cm    PW  E' Av 1 m/s  E' Lat: 0 1 m/s  E' Sept: 0 09 m/s  E/E' Av 8  E/E' Lat: 7 46  E/E' Sept: 8 17  MV A Vidal: 0 73 m/s  MV Dec Motley: 3 77 m/s2  MV DecT: 202 36 ms  MV E Vidal: 0 76 m/s  MV E/A Ratio: 1 04    [facility] Accredited Echocardiography Laboratory    Prepared and electronically signed by    [facility]  Signed 2021 21:05:08    No results found for this or any previous visit  No results found for this or any previous visit  No results found for this or any previous visit  Echo complete with contrast if indicated  [employer] [address]    Transthoracic Echocardiogram  2D, M-mode, Doppler, and Color Doppler    Study date:  2021    Patient: Marcos Andrade  MR number: [id number]  Account number: [de-identified]  : 1945  Age: 67 years  Gender: Male  Status: Outpatient  Location: [facility]  Height: 71 in  Weight: 171 6 lb  BP: 118/ 62 mmHg    Indications: Coronary artery disease    Diagnoses: I25 10 - Atherosclerotic heart disease of native coronary artery without angina pectoris    Sonographer:  GAYLE Esteban  Primary Physician:  Joey Grande MD  Referring Physician:  Ashley Castillo MD  Group:  Henry Archuleta  Interpreting Physician:  [facility],     SUMMARY    LEFT VENTRICLE:  Size was at the upper limits of normal   Systolic function was normal  Ejection fraction was estimated to be 55 %  There were no regional wall motion abnormalities  Doppler parameters were consistent with abnormal left ventricular relaxation (grade 1 diastolic dysfunction)  RIGHT VENTRICLE:  The size was normal   Systolic function was normal     MITRAL VALVE:  There was mild regurgitation      TRICUSPID VALVE:  There was mild regurgitation  Estimated peak PA pressure was 40 mmHg assuming an estimated right atrial pressure of 5-8 mmHg  PULMONIC VALVE:  There was trace regurgitation  AORTA:  The root measures 3 7 cm  The ascending aorta measures 3 6 cm     COMPARISONS:  Prior study 2017 is not available for comparison  HISTORY: PRIOR HISTORY: CAD,CABG,CVA,Smoker    PROCEDURE: The study was performed in the [facility]  This was a routine study  The transthoracic approach was used  The study included complete 2D imaging, M-mode, complete spectral Doppler, and color Doppler  The heart rate was  70 bpm, at the start of the study  Images were obtained from the parasternal, apical, subcostal, and suprasternal notch acoustic windows  Image quality was adequate  LEFT VENTRICLE: Size was at the upper limits of normal  Systolic function was normal  Ejection fraction was estimated to be 55 %  There were no regional wall motion abnormalities  Wall thickness was normal  DOPPLER: Doppler parameters were  consistent with abnormal left ventricular relaxation (grade 1 diastolic dysfunction)  RIGHT VENTRICLE: The size was normal  Systolic function was normal     LEFT ATRIUM: Size was normal     RIGHT ATRIUM: Size was normal     MITRAL VALVE: There was annular calcification  Valve structure was normal  There was normal leaflet separation  DOPPLER: The transmitral velocity was within the normal range  There was no evidence for stenosis  There was mild  regurgitation  AORTIC VALVE: The valve was trileaflet  Leaflets exhibited mildly increased thickness, mild calcification, normal cuspal separation, and sclerosis  DOPPLER: Transaortic velocity was within the normal range  There was no evidence for  stenosis  There was no significant regurgitation  TRICUSPID VALVE: The valve structure was normal  There was normal leaflet separation  DOPPLER: The transtricuspid velocity was within the normal range   There was no evidence for stenosis  There was mild regurgitation  Estimated peak PA  pressure was 40 mmHg assuming an estimated right atrial pressure of 5-8 mmHg  PULMONIC VALVE: Not well visualized  DOPPLER: The transpulmonic velocity was within the normal range  There was trace regurgitation  PERICARDIUM: There was no pericardial effusion  AORTA: The root measures 3 7 cm  The ascending aorta measures 3 6 cm  SYSTEMIC VEINS: IVC: The inferior vena cava was normal in size   Respirophasic changes were normal     SYSTEM MEASUREMENT TABLES    2D  %FS: 28 05 %  Ao Diam: 3 66 cm  Ao asc: 3 55 cm  EDV(Teich): 125 27 ml  EF(Teich): 53 96 %  ESV(Teich): 57 68 ml  IVSd: 0 97 cm  LA Diam: 3 32 cm  LAAs A4C: 17 23 cm2  LAESV A-L A4C: 50 93 ml  LAESV MOD A4C: 42 8 ml  LALs A4C: 4 95 cm  LVEDV MOD A4C: 75 63 ml  LVEF MOD A4C: 57 82 %  LVESV MOD A4C: 31 9 ml  LVIDd: 5 13 cm  LVIDs: 3 69 cm  LVLd A4C: 8 1 cm  LVLs A4C: 6 73 cm  LVPWd: 0 78 cm  RAAs A4C: 18 14 cm2  RAESV A-L: 53 42 ml  RAESV MOD: 54 77 ml  RALs: 5 23 cm  RVIDd: 3 47 cm  SV MOD A4C: 43 73 ml  SV(Teich): 67 6 ml    CW  TR Vmax: 2 88 m/s  TR maxP 21 mmHg    MM  TAPSE: 2 25 cm    PW  E' Av 1 m/s  E' Lat: 0 1 m/s  E' Sept: 0 09 m/s  E/E' Av 8  E/E' Lat: 7 46  E/E' Sept: 8 17  MV A Vidal: 0 73 m/s  MV Dec Grand: 3 77 m/s2  MV DecT: 202 36 ms  MV E Vidal: 0 76 m/s  MV E/A Ratio: 1 04    [employer] Accredited Echocardiography Laboratory    Prepared and electronically signed by    Jm Greenwood DO  Signed 2021 21:05:08        *-*-*-*-*-*-*-*-*-*-*-*-*-*-*-*-*-*-*-*-*-*-*-*-*-*-*-*-*-*-*-*-*-*-*-*-*-*-*-*-*-*-*-*-*-*-*-*-*-*-*-*-*-*-  SIGNATURES:   @WPO@   Josue Laureano MD     *-*-*-*-*-*-*-*-*-*-*-*-*-*-*-*-*-*-*-*-*-*-*-*-*-*-*-*-*-*-*-*-*-*-*-*-*-*-*-*-*-*-*-*-*-*-*-*-*-*-*-*-*-*-    PAST MEDICAL HISTORY:  Past Medical History:   Diagnosis Date    AAA [facility]abdominal aortic aneurysm) ([facility] )     PVD (peripheral vascular disease) ([facility] )     Tobacco abuse     PAST SURGICAL HISTORY:   Past Surgical History:   Procedure Laterality Date    FEMORAL BYPASS           FAMILY HISTORY:  Family History   Problem Relation Age of Onset    Aortic aneurysm Mother     Coronary artery disease Mother     SOCIAL HISTORY:  Social History     Tobacco Use   Smoking Status Current Some Day Smoker    Types: Cigarettes   Smokeless Tobacco Never Used   Tobacco Comment    passive smoke exposure      Social History     Substance and Sexual Activity   Alcohol Use Not on file     Social History     Substance and Sexual Activity   Drug Use Not on file    [unfilled]     *-*-*-*-*-*-*-*-*-*-*-*-*-*-*-*-*-*-*-*-*-*-*-*-*-*-*-*-*-*-*-*-*-*-*-*-*-*-*-*-*-*-*-*-*-*-*-*-*-*-*-*-*-*  ALLERGIES:  Allergies   Allergen Reactions    Ciprofloxacin      Dark stool , upset stomach    Duloxetine Other (See Comments)     Cramps    Meloxicam      Other reaction(s): itchy, blotchy, stomach upset    Nifedipine     Pentoxifylline      Dark stool, upset stomach    Tramadol Other (See Comments)     Ineffective       CURRENT SCHEDULED MEDICATIONS:    Current Outpatient Medications:     amLODIPine (NORVASC) 10 mg tablet, TAKE ONE TABLET BY MOUTH EVERY DAY, Disp: , Rfl:     aspirin 81 MG tablet, Take 81 mg by mouth daily, Disp: , Rfl:     atorvastatin (LIPITOR) 40 mg tablet, TAKE ONE TABLET BY MOUTH EVERY DAY , Disp: 90 tablet, Rfl: 0    metoprolol succinate (TOPROL-XL) 25 mg 24 hr tablet, TAKE ONE TABLET BY MOUTH EVERY DAY, Disp: , Rfl:     Multiple Vitamins-Minerals (MULTIVITAMIN ADULT EXTRA C PO), Take 1 capsule by mouth, Disp: , Rfl:     clobetasol (TEMOVATE) 0 05 % ointment, Apply to affected area daily as needed, Disp: , Rfl:      *-*-*-*-*-*-*-*-*-*-*-*-*-*-*-*-*-*-*-*-*-*-*-*-*-*-*-*-*-*-*-*-*-*-*-*-*-*-*-*-*-*-*-*-*-*-*-*-*-*-*-*-*-*

## 2022-08-11 ENCOUNTER — OFFICE VISIT (OUTPATIENT)
Dept: CARDIOLOGY CLINIC | Facility: CLINIC | Age: 77
End: 2022-08-11
Payer: COMMERCIAL

## 2022-08-11 VITALS
DIASTOLIC BLOOD PRESSURE: 66 MMHG | SYSTOLIC BLOOD PRESSURE: 148 MMHG | WEIGHT: 189.6 LBS | BODY MASS INDEX: 26.44 KG/M2 | HEART RATE: 79 BPM

## 2022-08-11 DIAGNOSIS — I69.398 CVA, OLD, HOMONYMOUS HEMIANOPSIA: ICD-10-CM

## 2022-08-11 DIAGNOSIS — F17.210 TOBACCO DEPENDENCE DUE TO CIGARETTES: ICD-10-CM

## 2022-08-11 DIAGNOSIS — I25.10 CORONARY ARTERY DISEASE INVOLVING NATIVE CORONARY ARTERY OF NATIVE HEART WITHOUT ANGINA PECTORIS: Primary | ICD-10-CM

## 2022-08-11 DIAGNOSIS — I27.20 MILD PULMONARY HYPERTENSION (HCC): ICD-10-CM

## 2022-08-11 DIAGNOSIS — E78.2 MIXED HYPERLIPIDEMIA: ICD-10-CM

## 2022-08-11 DIAGNOSIS — H53.469 CVA, OLD, HOMONYMOUS HEMIANOPSIA: ICD-10-CM

## 2022-08-11 DIAGNOSIS — I51.89 DIASTOLIC DYSFUNCTION WITHOUT HEART FAILURE: ICD-10-CM

## 2022-08-11 PROCEDURE — 93000 ELECTROCARDIOGRAM COMPLETE: CPT | Performed by: INTERNAL MEDICINE

## 2022-08-11 PROCEDURE — 99214 OFFICE O/P EST MOD 30 MIN: CPT | Performed by: INTERNAL MEDICINE

## 2022-08-11 NOTE — ASSESSMENT & PLAN NOTE
Mr Yajaira Hollingsworth is overall stable from cardiac perspective with no recent symptoms that suggest angina or heart failure  His blood pressure was elevated in office today but he states that at home it is consistently normal   He attributes elevated blood pressure rate to having some pretzels earlier  On physical examination has mild systolic murmur consistent with aortic valve stenosis  There is no evidence of pulmonary or peripheral vascular congestion  Echocardiogram last year did not identify significant valvular heart disease  Lipids are reasonably well controlled  Unfortunately continues to smoke and has declined smoking cessation counseling or intervention  -- at this time we will continue his current medications  -- I am advising him to keep a track of his blood pressures and take the record to next primary physician's visit  Our goal is to keep his systolic blood pressures consistently less than 206 mmHg and diastolic blood pressures consistently less than 80 mmHg  -- encouraged eating healthy and exercising regularly  Cardiology follow-up will be made for 1 year time

## 2022-08-11 NOTE — PATIENT INSTRUCTIONS
CARDIOLOGY ASSESSMENT & PLAN:  1  Coronary artery disease involving native coronary artery of native heart without angina pectoris  POCT ECG   2  Tobacco dependence due to cigarettes     3  Mixed hyperlipidemia     4  Diastolic dysfunction without heart failure     5  CVA, old, homonymous hemianopsia     6  Mild pulmonary hypertension (HCC)       Coronary artery disease involving native heart without angina pectoris  Mr Margret Mast is overall stable from cardiac perspective with no recent symptoms that suggest angina or heart failure  His blood pressure was elevated in office today but he states that at home it is consistently normal   He attributes elevated blood pressure rate to having some pretzels earlier  On physical examination has mild systolic murmur consistent with aortic valve stenosis  There is no evidence of pulmonary or peripheral vascular congestion  Echocardiogram last year did not identify significant valvular heart disease  Lipids are reasonably well controlled  Unfortunately continues to smoke and has declined smoking cessation counseling or intervention  -- at this time we will continue his current medications  -- I am advising him to keep a track of his blood pressures and take the record to next primary physician's visit  Our goal is to keep his systolic blood pressures consistently less than 196 mmHg and diastolic blood pressures consistently less than 80 mmHg  -- encouraged eating healthy and exercising regularly  Cardiology follow-up will be made for 1 year time

## 2022-08-11 NOTE — PROGRESS NOTES
[facility]  [address] [facility], [place] [address]  Phone#  [phone]  Fax#  [phone]  *-*-*-*-*-*-*-*-*-*-*-*-*-*-*-*-*-*-*-*-*-*-*-*-*-*-*-*-*-*-*-*-*-*-*-*-*-*-*-*-*-*-*-*-*-*-*-*-*-*-*-*-*-*  Loly Waters DATE: 08/11/22 3:55 PM  PATIENT NAME: Carlos Manuel Grijalva   1945    [id number]  Age: 67 y o  Sex: male  AUTHOR: Josue Laureano MD  PRIMARYCARE PHYSICIAN: Richard Araujo MD    DIAGNOSES:   1  History of coronary artery disease status post CABG 2011   2  Abdominal aortic aneurysm repair in 2015   3  Peripheral vascular disease status post aortofemoral bypass in July 2016   4  Benign essential hypertension   5  Mixed dyslipidemia   6  Osteoarthritis and degenerative joint disease   7  History of anemia   8  Peripheral neuropathy   9  History of constipation   10  Non-small cell lung cancer involving right Lung upper lobe, stage IIIA, confirmed in December 2018, completed 7 week course of chemo (Taxol and carboplatin) and radiation- March 2019  Right hilar lymph node metastasis  11  Bilateral inguinal hernia s/p repair Sep 2019  12  Carotid artery disease   13  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, with persistent central visual field loss in right eye  12  Right chest wall Shingles 2021    ECHOCARDIOGRAM June 22, 2021: Normal left ventricular size and systolic function, EF around 22%, grade 1 diastolic dysfunction, normal right ventricular size and systolic function, normal left and right atrial size, mild mitral valve regurgitation, aortic valve sclerosis with some focal calcification, no stenosis or regurgitation, mild tricuspid valve regurgitation, mild pulmonary hypertension, estimated RVSP/PASP is 40 mmHg, no obvious pericardial effusion  Borderline active see of ascending thoracic aorta measuring 3 7 cm  17 day EVENT MONITORING in December 2019-January 2020 showed predominant sinus rhythm with no occurrence of atrial fibrillation   Supraventricular and ventricular ectopy burden was less than 1%  There was single occurrence of asymptomatic nonsustained SVT  No symptoms were reported during the period of monitoring  PERSANTINE NUCLEAR STRESS TEST in December 2014 showed normal perfusion with no evidence of ischemia, EF 62%  ECHOCARDIOGRAM in December 2017 showed mild concentric left ventricular hypertrophy, normal left ventricular systolic function, EF around 01-49%, grade 1 diastolic dysfunction with normal left ventricular filling pressures, trace mitral valve regurgitation, aortic valve sclerosis, mild tricuspid valve regurgitation and no pulmonary hypertension  I a     CURRENT ECG:  Results for orders placed or performed in visit on 08/11/22   POCT ECG    Narrative    Sinus rhythm with no significant ST-T wave abnormalities  No evidence of prior infarction, or chamber enlargement or hypertrophy  HR 79 beats per minute  Normal axis and intervals  CARDIOLOGY ASSESSMENT & PLAN:  1  Coronary artery disease involving native coronary artery of native heart without angina pectoris  POCT ECG   2  Tobacco dependence due to cigarettes     3  Mixed hyperlipidemia     4  Diastolic dysfunction without heart failure     5  CVA, old, homonymous hemianopsia     6  Mild pulmonary hypertension (HCC)       Coronary artery disease involving native heart without angina pectoris  Mr Tejal Vang is overall stable from cardiac perspective with no recent symptoms that suggest angina or heart failure  His blood pressure was elevated in office today but he states that at home it is consistently normal   He attributes elevated blood pressure rate to having some pretzels earlier  On physical examination has mild systolic murmur consistent with aortic valve stenosis  There is no evidence of pulmonary or peripheral vascular congestion  Echocardiogram last year did not identify significant valvular heart disease  Lipids are reasonably well controlled    Unfortunately continues to smoke and has declined smoking cessation counseling or intervention  -- at this time we will continue his current medications  -- I am advising him to keep a track of his blood pressures and take the record to next primary physician's visit  Our goal is to keep his systolic blood pressures consistently less than 856 mmHg and diastolic blood pressures consistently less than 80 mmHg  -- encouraged eating healthy and exercising regularly  Cardiology follow-up will be made for 1 year time  INTERVAL HISTORY & HISTORY OF PRESENT ILLNESS:  Festus Patel is here for follow-up regarding his cardiac comorbidities which include: Coronary artery disease with history of CABG, history of AAA repair, peripheral artery disease, carotid artery disease, CVA and chronic tobacco dependence  He was last seen by me in June 2021  Today he reports that he has been overall all right  Does have visual field cut relating to history of stroke  Reports occasional sharp pain in the left anterior chest   Denies any unusual shortness of breath or dizziness or palpitations or passing out or near passing out or pedal edema  Reports of occasional pain in the right ribcage where he had shingles before  Remains in remission from his history of lung cancer  Right due to subcutaneous bleeding reports that he monitors his blood pressures at home and usually it is in 120s over 50s to 60s  Has had no recent hospitalizations or other significant illnesses  Continues to have symptoms of peripheral neuropathy and of claudication in lower extremities  Continues to smoke a pack of cigarettes a day  Denies significant alcohol use  Current medications:  Atorvastatin 40 mg daily, amlodipine 10 mg daily, aspirin 81 mg daily, metoprolol succinate 25 mg daily  Last blood work is from May 2022  Sodium was 138, potassium 4 4, chloride 104, bicarb 26, BUN 19, creatinine 1 52, LFTs were normal   GFR was 47   Total cholesterol was 135, triglyceride 171, direct HDL was 30, calculated LDL was 71  Hemoglobin A1c was 6 5    CT scan of chest in May 2022:  1  Minimal decrease in size of the treated peripheral right upper lobe tumor  The surrounding post radiation fibrotic changes have slightly increased   2  No evidence of recurrence or metastatic disease in the chest, within   limitations of unenhanced study  2  Mild decreased in size of a loculated right pleural effusion       REVIEW OF SYMPTOMS:    Positive for:  As noted above in HPI  Negative for: All remaining as reviewed below and in HPI  SYSTEM SYMPTOMS REVIEWED:  General--weight change, fever, night sweats  Respiratory--cough, wheezing, shortness of breath, sputum production  Cardiovascular--chest pain, syncope, dyspnea on exertion, edema, decline in exercise tolerance, claudication   Gastrointestinal--persistent vomiting, diarrhea, abdominal distention, blood in stool   Muscular or skeletal--joint pain or swelling   Neurologic--headaches, syncope, abnormal movement  Hematologic--history of easy bruising and bleeding   Endocrine--thyroid enlargement, heat or cold intolerance, polyuria   Psychiatric--anxiety, depression     *-*-*-*-*-*-*-*-*-*-*-*-*-*-*-*-*-*-*-*-*-*-*-*-*-*-*-*-*-*-*-*-*-*-*-*-*-*-*-*-*-*-*-*-*-*-*-*-*-*-*-*-*-*-  VITAL SIGNS:  Vitals:    08/11/22 1513   BP: 148/66   BP Location: Left arm   Patient Position: Sitting   Cuff Size: Large   Pulse: 79   Weight: 86 kg (189 lb 9 6 oz)     Weight (last 2 days)     Date/Time Weight    08/11/22 1513 86 (189 6)       ,   Wt Readings from Last 3 Encounters:   08/11/22 86 kg (189 lb 9 6 oz)   06/28/21 80 7 kg (178 lb)   12/22/20 78 kg (172 lb)    , Body mass index is 26 44 kg/m²      *-*-*-*-*-*-*-*-*-*-*-*-*-*-*-*-*-*-*-*-*-*-*-*-*-*-*-*-*-*-*-*-*-*-*-*-*-*-*-*-*-*-*-*-*-*-*-*-*-*-*-*-*-*-  PHYSICAL EXAM:  General Appearance:    Alert, cooperative, no distress, appears stated age   Head, Eyes, ENT:    No obvious abnormality, moist mucous mebranes  Neck:   Supple, no carotid bruit or JVD   Back:     Symmetric, no curvature  Lungs:     Respirations unlabored  Clear to auscultation bilaterally,    Chest wall:    No tenderness or deformity   Heart:    Regular rate and rhythm, S1 and S2 normal, grade 2/6 systolic murmur along sternal border, rub  or gallop  Abdomen:     Soft, non-tender, No obvious masses, or organomegaly   Extremities:   Extremities warm, no cyanosis or edema    Skin:    bruising is noted in LUE     *-*-*-*-*-*-*-*-*-*-*-*-*-*-*-*-*-*-*-*-*-*-*-*-*-*-*-*-*-*-*-*-*-*-*-*-*-*-*-*-*-*-*-*-*-*-*-*-*-*-*-*-*-*-  CURRENT MEDICATION LIST:    Current Outpatient Medications:     amLODIPine (NORVASC) 10 mg tablet, TAKE ONE TABLET BY MOUTH EVERY DAY, Disp: , Rfl:     aspirin 81 MG tablet, Take 81 mg by mouth daily, Disp: , Rfl:     atorvastatin (LIPITOR) 40 mg tablet, TAKE ONE TABLET BY MOUTH EVERY DAY , Disp: 90 tablet, Rfl: 0    metoprolol succinate (TOPROL-XL) 25 mg 24 hr tablet, TAKE ONE TABLET BY MOUTH EVERY DAY, Disp: , Rfl:     Multiple Vitamins-Minerals (MULTIVITAMIN ADULT EXTRA C PO), Take 1 capsule by mouth, Disp: , Rfl:     clobetasol (TEMOVATE) 0 05 % ointment, Apply to affected area daily as needed, Disp: , Rfl:     ALLERGIES:  Allergies   Allergen Reactions    Ciprofloxacin      Dark stool , upset stomach    Duloxetine Other (See Comments)     Cramps    Meloxicam      Other reaction(s): itchy, blotchy, stomach upset    Nifedipine     Pentoxifylline      Dark stool, upset stomach    Tramadol Other (See Comments)     Ineffective          *-*-*-*-*-*-*-*-*-*-*-*-*-*-*-*-*-*-*-*-*-*-*-*-*-*-*-*-*-*-*-*-*-*-*-*-*-*-*-*-*-*-*-*-*-*-*-*-*-*-*-*-*-*-  The LABORATORY DATA:  I have personally reviewed pertinent labs      No results found for: NA  No results found for: K, CL, CO2, ANIONGAP, BUN, CREATININE, EGFR, GLUCOSE, CALCIUM, AST, ALT, ALKPHOS, PROT, BILITOT, MG  No results found for: WBC, HGB, PLT  No results found for: PT, PTT, INR  No results found for: CKMB, DIGOXIN  No results found for: TSH  No results found for: CHOL   Hemoglobin A1C   Date Value Ref Range Status   2022 6 5 (H) <5 7 % Final     Comment:     Reference Range  Non-diabetic                     <5 7  Pre-diabetic                     5 7-6 4  Diabetic                         >=6 5  ADA target for diabetic control  <=7     No results found for: Lety Merck, GRAMSTAIN, URINECX, WOUNDCULT, BODYFLUIDCUL, MRSACULTURE, INFLUAPCR, INFLUBPCR, RSVPCR, LEGIONELLAUR, CDIFFTOXINB    *-*-*-*-*-*-*-*-*-*-*-*-*-*-*-*-*-*-*-*-*-*-*-*-*-*-*-*-*-*-*-*-*-*-*-*-*-*-*-*-*-*-*-*-*-*-*-*-*-*-*-*-*-*-  PREVIOUS CARDIOLOGY & RADIOLOGY RESULTS:  Results for orders placed during the hospital encounter of 21    Echo complete with contrast if indicated    Narrative  Vero Mayar  [facility], [address] [place] [address]    Transthoracic Echocardiogram  2D, M-mode, Doppler, and Color Doppler    Study date:  2021    Patient: Bernice Zaargoza  MR number: [id number]  Account number: [de-identified]  : 1945  Age: 67 years  Gender: Male  Status: Outpatient  Location: [facility]  Height: 71 in  Weight: 171 6 lb  BP: 118/ 62 mmHg    Indications: Coronary artery disease    Diagnoses: I25 10 - Atherosclerotic heart disease of native coronary artery without angina pectoris    Sonographer:  Fermín Michelle, [address] [place] [address]  Primary Physician:  Leonila Ortiz MD  Referring Physician:  Louis Mcbride MD  [facility]  Interpreting Physician:  DO SHANTA Hill    LEFT VENTRICLE:  Size was at the upper limits of normal   Systolic function was normal  Ejection fraction was estimated to be 55 %  There were no regional wall motion abnormalities  Doppler parameters were consistent with abnormal left ventricular relaxation (grade 1 diastolic dysfunction)      RIGHT VENTRICLE:  The size was normal   Systolic function was normal     MITRAL VALVE:  There was mild regurgitation  TRICUSPID VALVE:  There was mild regurgitation  Estimated peak PA pressure was 40 mmHg assuming an estimated right atrial pressure of 5-8 mmHg  PULMONIC VALVE:  There was trace regurgitation  AORTA:  The root measures 3 7 cm  The ascending aorta measures 3 6 cm     COMPARISONS:  Prior study 2017 is not available for comparison  HISTORY: PRIOR HISTORY: CAD,CABG,CVA,Smoker    PROCEDURE: The study was performed in the [facility]  This was a routine study  The transthoracic approach was used  The study included complete 2D imaging, M-mode, complete spectral Doppler, and color Doppler  The heart rate was  70 bpm, at the start of the study  Images were obtained from the parasternal, apical, subcostal, and suprasternal notch acoustic windows  Image quality was adequate  LEFT VENTRICLE: Size was at the upper limits of normal  Systolic function was normal  Ejection fraction was estimated to be 55 %  There were no regional wall motion abnormalities  Wall thickness was normal  DOPPLER: Doppler parameters were  consistent with abnormal left ventricular relaxation (grade 1 diastolic dysfunction)  RIGHT VENTRICLE: The size was normal  Systolic function was normal     LEFT ATRIUM: Size was normal     RIGHT ATRIUM: Size was normal     MITRAL VALVE: There was annular calcification  Valve structure was normal  There was normal leaflet separation  DOPPLER: The transmitral velocity was within the normal range  There was no evidence for stenosis  There was mild  regurgitation  AORTIC VALVE: The valve was trileaflet  Leaflets exhibited mildly increased thickness, mild calcification, normal cuspal separation, and sclerosis  DOPPLER: Transaortic velocity was within the normal range  There was no evidence for  stenosis  There was no significant regurgitation      TRICUSPID VALVE: The valve structure was normal  There was normal leaflet separation  DOPPLER: The transtricuspid velocity was within the normal range  There was no evidence for stenosis  There was mild regurgitation  Estimated peak PA  pressure was 40 mmHg assuming an estimated right atrial pressure of 5-8 mmHg  PULMONIC VALVE: Not well visualized  DOPPLER: The transpulmonic velocity was within the normal range  There was trace regurgitation  PERICARDIUM: There was no pericardial effusion  AORTA: The root measures 3 7 cm  The ascending aorta measures 3 6 cm  SYSTEMIC VEINS: IVC: The inferior vena cava was normal in size  Respirophasic changes were normal     SYSTEM MEASUREMENT TABLES    2D  %FS: 28 05 %  Ao Diam: 3 66 cm  Ao asc: 3 55 cm  EDV(Teich): 125 27 ml  EF(Teich): 53 96 %  ESV(Teich): 57 68 ml  IVSd: 0 97 cm  LA Diam: 3 32 cm  LAAs A4C: 17 23 cm2  LAESV A-L A4C: 50 93 ml  LAESV MOD A4C: 42 8 ml  LALs A4C: 4 95 cm  LVEDV MOD A4C: 75 63 ml  LVEF MOD A4C: 57 82 %  LVESV MOD A4C: 31 9 ml  LVIDd: 5 13 cm  LVIDs: 3 69 cm  LVLd A4C: 8 1 cm  LVLs A4C: 6 73 cm  LVPWd: 0 78 cm  RAAs A4C: 18 14 cm2  RAESV A-L: 53 42 ml  RAESV MOD: 54 77 ml  RALs: 5 23 cm  RVIDd: 3 47 cm  SV MOD A4C: 43 73 ml  SV(Teich): 67 6 ml    CW  TR Vmax: 2 88 m/s  TR maxP 21 mmHg    MM  TAPSE: 2 25 cm    PW  E' Av 1 m/s  E' Lat: 0 1 m/s  E' Sept: 0 09 m/s  E/E' Av 8  E/E' Lat: 7 46  E/E' Sept: 8 17  MV A Vidal: 0 73 m/s  MV Dec [place]: 3 77 m/s2  MV DecT: 202 36 ms  MV E Vidal: 0 76 m/s  MV E/A Ratio: 1 04    [employer] Accredited Echocardiography Laboratory    Prepared and electronically signed by    Kaity Membreno DO  Signed 2021 21:05:08    No results found for this or any previous visit  No results found for this or any previous visit  No results found for this or any previous visit      Echo complete with contrast if indicated  [email or web address], [address] [place] [address]    Transthoracic Echocardiogram  2D, M-mode, Doppler, and Color Doppler    Study date:  2021    Patient: Chandana Zamora  MR number: [id number]  Account number: [de-identified]  : 1945  Age: 67 years  Gender: Male  Status: Outpatient  Location: [facility]  Height: 71 in  Weight: 171 6 lb  BP: 118/ 62 mmHg    Indications: Coronary artery disease    Diagnoses: I25 10 - Atherosclerotic heart disease of native coronary artery without angina pectoris    Sonographer:  GAYLE Little  Primary Physician:  Mina Belcher MD  Referring Physician:  Janny Pittman MD  Group:  [facility]  Interpreting Physician:  Jorge Grove DO    SUMMARY    LEFT VENTRICLE:  Size was at the upper limits of normal   Systolic function was normal  Ejection fraction was estimated to be 55 %  There were no regional wall motion abnormalities  Doppler parameters were consistent with abnormal left ventricular relaxation (grade 1 diastolic dysfunction)  RIGHT VENTRICLE:  The size was normal   Systolic function was normal     MITRAL VALVE:  There was mild regurgitation  TRICUSPID VALVE:  There was mild regurgitation  Estimated peak PA pressure was 40 mmHg assuming an estimated right atrial pressure of 5-8 mmHg  PULMONIC VALVE:  There was trace regurgitation  AORTA:  The root measures 3 7 cm  The ascending aorta measures 3 6 cm     COMPARISONS:  Prior study 2017 is not available for comparison  HISTORY: PRIOR HISTORY: CAD,CABG,CVA,Smoker    PROCEDURE: The study was performed in the [facility]  This was a routine study  The transthoracic approach was used  The study included complete 2D imaging, M-mode, complete spectral Doppler, and color Doppler  The heart rate was  70 bpm, at the start of the study  Images were obtained from the parasternal, apical, subcostal, and suprasternal notch acoustic windows  Image quality was adequate      LEFT VENTRICLE: Size was at the upper limits of normal  Systolic function was normal  Ejection fraction was estimated to be 55 %  There were no regional wall motion abnormalities  Wall thickness was normal  DOPPLER: Doppler parameters were  consistent with abnormal left ventricular relaxation (grade 1 diastolic dysfunction)  RIGHT VENTRICLE: The size was normal  Systolic function was normal     LEFT ATRIUM: Size was normal     RIGHT ATRIUM: Size was normal     MITRAL VALVE: There was annular calcification  Valve structure was normal  There was normal leaflet separation  DOPPLER: The transmitral velocity was within the normal range  There was no evidence for stenosis  There was mild  regurgitation  AORTIC VALVE: The valve was trileaflet  Leaflets exhibited mildly increased thickness, mild calcification, normal cuspal separation, and sclerosis  DOPPLER: Transaortic velocity was within the normal range  There was no evidence for  stenosis  There was no significant regurgitation  TRICUSPID VALVE: The valve structure was normal  There was normal leaflet separation  DOPPLER: The transtricuspid velocity was within the normal range  There was no evidence for stenosis  There was mild regurgitation  Estimated peak PA  pressure was 40 mmHg assuming an estimated right atrial pressure of 5-8 mmHg  PULMONIC VALVE: Not well visualized  DOPPLER: The transpulmonic velocity was within the normal range  There was trace regurgitation  PERICARDIUM: There was no pericardial effusion  AORTA: The root measures 3 7 cm  The ascending aorta measures 3 6 cm  SYSTEMIC VEINS: IVC: The inferior vena cava was normal in size   Respirophasic changes were normal     SYSTEM MEASUREMENT TABLES    2D  %FS: 28 05 %  Ao Diam: 3 66 cm  Ao asc: 3 55 cm  EDV(Teich): 125 27 ml  EF(Teich): 53 96 %  ESV(Teich): 57 68 ml  IVSd: 0 97 cm  LA Diam: 3 32 cm  LAAs A4C: 17 23 cm2  LAESV A-L A4C: 50 93 ml  LAESV MOD A4C: 42 8 ml  LALs A4C: 4 95 cm  LVEDV MOD A4C: 75 63 ml  LVEF MOD A4C: 57 82 %  LVESV MOD A4C: 31 9 ml  LVIDd: 5 13 cm  LVIDs: 3 69 cm  LVLd A4C: 8 1 cm  LVLs A4C: 6 73 cm  LVPWd: 0 78 cm  RAAs A4C: 18 14 cm2  RAESV A-L: 53 42 ml  RAESV MOD: 54 77 ml  RALs: 5 23 cm  RVIDd: 3 47 cm  SV MOD A4C: 43 73 ml  SV(Teich): 67 6 ml    CW  TR Vmax: 2 88 m/s  TR maxP 21 mmHg    MM  TAPSE: 2 25 cm    PW  E' Av 1 m/s  E' Lat: 0 1 m/s  E' Sept: 0 09 m/s  E/E' Av 8  E/E' Lat: 7 46  E/E' Sept: 8 17  MV A Vidal: 0 73 m/s  MV Dec [place]: 3 77 m/s2  MV DecT: 202 36 ms  MV E Vidal: 0 76 m/s  MV E/A Ratio: 1 04    [facility] Accredited Echocardiography Laboratory    Prepared and electronically signed by    DO Becky  Signed 2021 21:05:08        *-*-*-*-*-*-*-*-*-*-*-*-*-*-*-*-*-*-*-*-*-*-*-*-*-*-*-*-*-*-*-*-*-*-*-*-*-*-*-*-*-*-*-*-*-*-*-*-*-*-*-*-*-*-  SIGNATURES:   [unfilled]   Josue Laureano MD     *-*-*-*-*-*-*-*-*-*-*-*-*-*-*-*-*-*-*-*-*-*-*-*-*-*-*-*-*-*-*-*-*-*-*-*-*-*-*-*-*-*-*-*-*-*-*-*-*-*-*-*-*-*-    PAST MEDICAL HISTORY:  Past Medical History:   Diagnosis Date    AAA [facility]ab[facility] aneurysm) ([facility] )     PVD (peripheral vascular disease) ([facility] )     Tobacco abuse     PAST SURGICAL HISTORY:   Past Surgical History:   Procedure Laterality Date    FEMORAL BYPASS           FAMILY HISTORY:  Family History   Problem Relation Age of Onset    Aortic aneurysm Mother     Coronary artery disease Mother     SOCIAL HISTORY:  Social History     Tobacco Use   Smoking Status Current Some Day Smoker    Types: Cigarettes   Smokeless Tobacco Never Used   Tobacco Comment    passive smoke exposure      Social History     Substance and Sexual Activity   Alcohol Use None     Social History     Substance and Sexual Activity   Drug Use Not on file    [unfilled]     *-*-*-*-*-*-*-*-*-*-*-*-*-*-*-*-*-*-*-*-*-*-*-*-*-*-*-*-*-*-*-*-*-*-*-*-*-*-*-*-*-*-*-*-*-*-*-*-*-*-*-*-*-*  ALLERGIES:  Allergies   Allergen Reactions    Ciprofloxacin      Dark stool , upset stomach    Duloxetine Other (See Comments)     Cramps    Meloxicam      Other reaction(s): itchy, blotchy, stomach upset    Nifedipine     Pentoxifylline      Dark stool, upset stomach    Tramadol Other (See Comments)     Ineffective       CURRENT SCHEDULED MEDICATIONS:    Current Outpatient Medications:     amLODIPine (NORVASC) 10 mg tablet, TAKE ONE TABLET BY MOUTH EVERY DAY, Disp: , Rfl:     aspirin 81 MG tablet, Take 81 mg by mouth daily, Disp: , Rfl:     atorvastatin (LIPITOR) 40 mg tablet, TAKE ONE TABLET BY MOUTH EVERY DAY , Disp: 90 tablet, Rfl: 0    metoprolol succinate (TOPROL-XL) 25 mg 24 hr tablet, TAKE ONE TABLET BY MOUTH EVERY DAY, Disp: , Rfl:     Multiple Vitamins-Minerals (MULTIVITAMIN ADULT EXTRA C PO), Take 1 capsule by mouth, Disp: , Rfl:     clobetasol (TEMOVATE) 0 05 % ointment, Apply to affected area daily as needed, Disp: , Rfl:      *-*-*-*-*-*-*-*-*-*-*-*-*-*-*-*-*-*-*-*-*-*-*-*-*-*-*-*-*-*-*-*-*-*-*-*-*-*-*-*-*-*-*-*-*-*-*-*-*-*-*-*-*-*

## 2023-09-15 ENCOUNTER — OFFICE VISIT (OUTPATIENT)
Dept: CARDIOLOGY CLINIC | Facility: CLINIC | Age: 78
End: 2023-09-15
Payer: COMMERCIAL

## 2023-09-15 VITALS
HEIGHT: 71 IN | WEIGHT: 185.6 LBS | DIASTOLIC BLOOD PRESSURE: 78 MMHG | HEART RATE: 67 BPM | SYSTOLIC BLOOD PRESSURE: 138 MMHG | BODY MASS INDEX: 25.98 KG/M2

## 2023-09-15 DIAGNOSIS — N18.31 STAGE 3A CHRONIC KIDNEY DISEASE (HCC): ICD-10-CM

## 2023-09-15 DIAGNOSIS — H53.469 CVA, OLD, HOMONYMOUS HEMIANOPSIA: ICD-10-CM

## 2023-09-15 DIAGNOSIS — I25.10 CORONARY ARTERY DISEASE INVOLVING NATIVE CORONARY ARTERY OF NATIVE HEART WITHOUT ANGINA PECTORIS: ICD-10-CM

## 2023-09-15 DIAGNOSIS — I71.40 ABDOMINAL ANEURYSM (HCC): ICD-10-CM

## 2023-09-15 DIAGNOSIS — F17.210 TOBACCO DEPENDENCE DUE TO CIGARETTES: ICD-10-CM

## 2023-09-15 DIAGNOSIS — I27.20 MILD PULMONARY HYPERTENSION (HCC): ICD-10-CM

## 2023-09-15 DIAGNOSIS — I10 PRIMARY HYPERTENSION: Primary | ICD-10-CM

## 2023-09-15 DIAGNOSIS — I69.398 CVA, OLD, HOMONYMOUS HEMIANOPSIA: ICD-10-CM

## 2023-09-15 DIAGNOSIS — I10 UNCONTROLLED HYPERTENSION: ICD-10-CM

## 2023-09-15 PROCEDURE — 99214 OFFICE O/P EST MOD 30 MIN: CPT | Performed by: INTERNAL MEDICINE

## 2023-09-15 PROCEDURE — 93000 ELECTROCARDIOGRAM COMPLETE: CPT | Performed by: INTERNAL MEDICINE

## 2023-09-15 RX ORDER — LISINOPRIL 10 MG/1
10 TABLET ORAL DAILY
COMMUNITY
Start: 2023-08-07

## 2023-09-15 NOTE — ASSESSMENT & PLAN NOTE
Mr. Chi Mariano is overall stable from cardiac perspective. Though his initial blood pressure was elevated repeat blood pressure check by me indicates normal blood pressure though slightly on the higher side. Unfortunately he has chronic tobacco dependence and he has not been interested in quitting smoking. Physical examination is overall unremarkable with no evidence of pulmonary or peripheral vascular congestion or signs of significant valvular heart disease. He is on good medications. ECG is overall benign. Blood work indicates elevated but stable creatinine, stage IIIb CKD, reasonably controlled diabetes and elevated triglyceride but normal LDL level. -- At this time I am advising him to continue current medications. -- I again counseled him about quitting smoking.  -- Encouraging him to avoid salty foods and monitor his blood pressures at home. -- We will plan for an 8-month office visit, earlier if necessary. -- Will repeat consider repeating echocardiogram following next visit. -- Am advising him to reestablish follow-up with his vascular doctor Dr. Nithin Clark regarding surveillance of his history of AAA repair.

## 2023-09-15 NOTE — PROGRESS NOTES
[facility]  [address] [address]  Phone#  [phone]. Fax#  [phone]  *-*-*-*-*-*-*-*-*-*-*-*-*-*-*-*-*-*-*-*-*-*-*-*-*-*-*-*-*-*-*-*-*-*-*-*-*-*-*-*-*-*-*-*-*-*-*-*-*-*-*-*-*-*  ENCOUNTER DATE: 09/15/23 3:42 PM  PATIENT NAME: Jelena Grijalva   1945    [id number]  Age: 65 y.o. Sex: male  AUTHOR: Josue Laureano  PRIMARYCARE PHYSICIAN: Randolph Loya MD    DIAGNOSES:   1. History of coronary artery disease status post CABG 2011   2. Abdominal aortic aneurysm repair in 2015   3. Peripheral vascular disease status post aortofemoral bypass in July 2016   4. Benign essential hypertension   5. Mixed dyslipidemia   6. Osteoarthritis and degenerative joint disease   7. History of anemia   8. Peripheral neuropathy   9. History of constipation   10. Non-small cell lung cancer involving right Lung upper lobe, stage IIIA, confirmed in December 2018, completed 7 week course of chemo (Taxol and carboplatin) and radiation- March 2019. Right hilar lymph node metastasis. 11. Bilateral inguinal hernia s/p repair Sep 2019. 12. Carotid artery disease   13. 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 12. Right chest wall Shingles 2021    ECHOCARDIOGRAM June 22, 2021: Normal left ventricular size and systolic function, EF around 81%, grade 1 diastolic dysfunction, normal right ventricular size and systolic function, normal left and right atrial size, mild mitral valve regurgitation, aortic valve sclerosis with some focal calcification, no stenosis or regurgitation, mild tricuspid valve regurgitation, mild pulmonary hypertension, estimated RVSP/PASP is 40 mmHg, no obvious pericardial effusion. Borderline active see of ascending thoracic aorta measuring 3.7 cm. 17 day EVENT MONITORING in December 2019-January 2020 showed predominant sinus rhythm with no occurrence of atrial fibrillation.  Supraventricular and ventricular ectopy burden was less than 1%. There was single occurrence of asymptomatic nonsustained SVT. No symptoms were reported during the period of monitoring. PERSANTINE NUCLEAR STRESS TEST in December 2014 showed normal perfusion with no evidence of ischemia, EF 62%. ECHOCARDIOGRAM in December 2017 showed mild concentric left ventricular hypertrophy, normal left ventricular systolic function, EF around 62-22%, grade 1 diastolic dysfunction with normal left ventricular filling pressures, trace mitral valve regurgitation, aortic valve sclerosis, mild tricuspid valve regurgitation and no pulmonary hypertension. I a     CURRENT ECG:  Results for orders placed or performed in visit on 09/15/23   POCT ECG    Narrative    Sinus rhythm, HR 67 bpm normal axis and intervals, possible prior septal infarction, delayed R wave transition, no significant chamber hypertrophy or enlargement. No evidence of ischemia. CARDIOLOGY ASSESSMENT & PLAN:  1. Primary hypertension  POCT ECG      2. Coronary artery disease involving native coronary artery of native heart without angina pectoris  POCT ECG      3. Uncontrolled hypertension        4. Abdominal aneurysm (HCC)        5. Stage 3a chronic kidney disease ([address])        6. Mild pulmonary hypertension ([address])        7. CVA, old, homonymous hemianopsia        8. Tobacco dependence due to cigarettes          Coronary artery disease involving native heart without angina pectoris  Mr. Elyssa Vaughan is overall stable from cardiac perspective. Though his initial blood pressure was elevated repeat blood pressure check by me indicates normal blood pressure though slightly on the higher side. Unfortunately he has chronic tobacco dependence and he has not been interested in quitting smoking. Physical examination is overall unremarkable with no evidence of pulmonary or peripheral vascular congestion or signs of significant valvular heart disease. He is on good medications.   ECG is overall benign. Blood work indicates elevated but stable creatinine, stage IIIb CKD, reasonably controlled diabetes and elevated triglyceride but normal LDL level. -- At this time I am advising him to continue current medications. -- I again counseled him about quitting smoking.  -- Encouraging him to avoid salty foods and monitor his blood pressures at home. -- We will plan for an 8-month office visit, earlier if necessary. -- Will repeat consider repeating echocardiogram following next visit. -- Am advising him to reestablish follow-up with his vascular doctor Dr. Leyla Park regarding surveillance of his history of AAA repair. INTERVAL HISTORY & HISTORY OF PRESENT ILLNESS:  Sherrie Henriquez is here for follow-up regarding his cardiac comorbidities which include: Coronary artery disease with history of CABG, history of AAA repair, peripheral artery disease, carotid artery disease, CVA and chronic tobacco dependence. He was last seen in August 2022. He mentions that since last year he has had no new major diagnoses or hospitalizations or significant illnesses. He mentions that he remains in remission from his history of lung cancer. From a symptom perspective he reports that he was feeling lightheaded and dizzy sometime back when he was taking a special coffee infused with herbal supplements. The symptoms resolved when he is give up on the coffee. From a symptom perspective he denies typical anginal-like chest pain shortness of breath with normal activities orthopnea PND pedal edema or symptoms of intermittent claudication. He does get short of breath with exertion. Previously experienced dizziness is now resolved since he gave up the coughing. Mentions that recent home blood pressures are overall normal.  They were high when he was having the herbal infused coffee previously. He continues to experience numbness in the left anterior chest relating to his thoracotomy and history of shingles. Continues to smoke a pack of cigarettes a day. Denies significant alcohol use. Current medications:  Atorvastatin 40 mg daily, amlodipine 10 mg daily, aspirin 81 mg daily, metoprolol succinate 25 mg daily, lisinopril 10 mg daily. Routine chemistry from 7/25/2023 at [facility]:  Sodium 135 potassium 4.7 chloride 101 bicarb 24 BUN 22 creatinine 1.73 hemoglobin A1c 6.8 GFR 40  Lipid profile in December 2022 showed total cholesterol 150 direct HDL 31 triglyceride 241 calculated LDL 71    REVIEW OF SYMPTOMS:    Positive for:  As noted above in HPI  Negative for: All remaining as reviewed below and in HPI. SYSTEM SYMPTOMS REVIEWED:  General--weight change, fever, night sweats  Respiratory--cough, wheezing, shortness of breath, sputum production  Cardiovascular--chest pain, syncope, dyspnea on exertion, edema, decline in exercise tolerance, claudication   Gastrointestinal--persistent vomiting, diarrhea, abdominal distention, blood in stool   Muscular or skeletal--joint pain or swelling   Neurologic--headaches, syncope, abnormal movement  Hematologic--history of easy bruising and bleeding   Endocrine--thyroid enlargement, heat or cold intolerance, polyuria   Psychiatric--anxiety, depression     *-*-*-*-*-*-*-*-*-*-*-*-*-*-*-*-*-*-*-*-*-*-*-*-*-*-*-*-*-*-*-*-*-*-*-*-*-*-*-*-*-*-*-*-*-*-*-*-*-*-*-*-*-*-  VITAL SIGNS:  Vitals:    09/15/23 1500 09/15/23 1538   BP: 162/70 138/78   BP Location:  Right arm   Patient Position:  Sitting   Cuff Size:  Standard   Pulse: 67    Weight: 84.2 kg (185 lb 9.6 oz)    Height: 5' 11" (1.803 m)      Weight (last 2 days)     Date/Time Weight    09/15/23 1500 84.2 (185.6)       ,   Wt Readings from Last 3 Encounters:   09/15/23 84.2 kg (185 lb 9.6 oz)   08/11/22 86 kg (189 lb 9.6 oz)   06/28/21 80.7 kg (178 lb)    , Body mass index is 25.89 kg/m².     *-*-*-*-*-*-*-*-*-*-*-*-*-*-*-*-*-*-*-*-*-*-*-*-*-*-*-*-*-*-*-*-*-*-*-*-*-*-*-*-*-*-*-*-*-*-*-*-*-*-*-*-*-*-  PHYSICAL EXAM:  General Appearance:    Alert, cooperative, no distress, appears stated age   Head, Eyes, ENT:    No obvious abnormality, moist mucous mebranes. Neck:   Supple, no carotid bruit or JVD   Back:     Symmetric, no curvature. Lungs:     Respirations unlabored. Clear to auscultation bilaterally,    Chest wall:    No tenderness or deformity   Heart:    Regular rate and rhythm, S1 and S2 normal, grade 2/6 systolic murmur along sternal border, rub  or gallop. Abdomen:     Soft, non-tender, No obvious masses, or organomegaly   Extremities:   Extremities warm, no cyanosis or edema    Skin:    bruising is noted in LUE     *-*-*-*-*-*-*-*-*-*-*-*-*-*-*-*-*-*-*-*-*-*-*-*-*-*-*-*-*-*-*-*-*-*-*-*-*-*-*-*-*-*-*-*-*-*-*-*-*-*-*-*-*-*-  CURRENT MEDICATION LIST:    Current Outpatient Medications:   •  amLODIPine (NORVASC) 10 mg tablet, TAKE ONE TABLET BY MOUTH EVERY DAY, Disp: , Rfl:   •  aspirin 81 MG tablet, Take 81 mg by mouth daily, Disp: , Rfl:   •  atorvastatin (LIPITOR) 40 mg tablet, TAKE ONE TABLET BY MOUTH EVERY DAY , Disp: 90 tablet, Rfl: 0  •  metoprolol succinate (TOPROL-XL) 25 mg 24 hr tablet, TAKE ONE TABLET BY MOUTH EVERY DAY, Disp: , Rfl:   •  Multiple Vitamins-Minerals (MULTIVITAMIN ADULT EXTRA C PO), Take 1 capsule by mouth, Disp: , Rfl:   •  Tadalafil (CIALIS PO), Take by mouth if needed, Disp: , Rfl:   •  lisinopril (ZESTRIL) 10 mg tablet, Take 10 mg by mouth daily, Disp: , Rfl:     ALLERGIES:  Allergies   Allergen Reactions   • Ciprofloxacin      Dark stool , upset stomach   • Duloxetine Other (See Comments)     Cramps   • Meloxicam      Other reaction(s): itchy, blotchy, stomach upset   • Nifedipine    • Pentoxifylline      Dark stool, upset stomach   • Tramadol Other (See Comments)     Ineffective          *-*-*-*-*-*-*-*-*-*-*-*-*-*-*-*-*-*-*-*-*-*-*-*-*-*-*-*-*-*-*-*-*-*-*-*-*-*-*-*-*-*-*-*-*-*-*-*-*-*-*-*-*-*-  The LABORATORY DATA:  I have personally reviewed pertinent labs.     No results found for: "NA"  No results found for: "K", "CL", "CO2", "ANIONGAP", "BUN", "CREATININE", "EGFR", "GLUCOSE", "CALCIUM", "AST", "ALT", "ALKPHOS", "PROT", "BILITOT", "MG"  No results found for: "WBC", "HGB", "PLT"  No results found for: "PT", "PTT", "INR"  No results found for: "CKMB", "DIGOXIN"  No results found for: "TSH"  No results found for: "CHOL"   Hemoglobin A1C   Date Value Ref Range Status   2023 6.8 (H) <5.7 % Final     Comment:     Reference Range  Non-diabetic                     <5.7  Pre-diabetic                     5.7-6.4  Diabetic                         >=6.5  ADA target for diabetic control  <=7     No results found for: "Camron Olea", "SPUTUMCULTUR", "[place] Tima", "Rickie Anisha", "WOUNDCULT", "BODYFLUIDCUL", "Cathren Amen", "INFLUAPCR", "INFLUBPCR", "RSVPCR", "Sharon Lau", "CDIFFTOXINB"    *-*-*-*-*-*-*-*-*-*-*-*-*-*-*-*-*-*-*-*-*-*-*-*-*-*-*-*-*-*-*-*-*-*-*-*-*-*-*-*-*-*-*-*-*-*-*-*-*-*-*-*-*-*-  PREVIOUS CARDIOLOGY & RADIOLOGY RESULTS:  Results for orders placed during the hospital encounter of 21    Echo complete with contrast if indicated    Narrative  [address], [address] [place] [address]    Transthoracic Echocardiogram  2D, M-mode, Doppler, and Color Doppler    Study date:  2021    Patient: Andrés Fernandez  MR number: [id number]  Account number: [de-identified]  : 1945  Age: 67 years  Gender: Male  Status: Outpatient  Location: [facility]  Height: 71 in  Weight: 171.6 lb  BP: 118/ 62 mmHg    Indications: Coronary artery disease    Diagnoses: I25.10 - Atherosclerotic heart disease of native coronary artery without angina pectoris    Sonographer:  Demond Hagen RCS  Primary Physician:  Reginaldo Tiwari MD  Referring Physician:  Eduardo Abbasi MD  Group:  [facility]  Interpreting Physician:  Leeroy Vo DO    SUMMARY    LEFT VENTRICLE:  Size was at the upper limits of normal.  Systolic function was normal. Ejection fraction was estimated to be 55 %. There were no regional wall motion abnormalities. Doppler parameters were consistent with abnormal left ventricular relaxation (grade 1 diastolic dysfunction). RIGHT VENTRICLE:  The size was normal.  Systolic function was normal.    MITRAL VALVE:  There was mild regurgitation. TRICUSPID VALVE:  There was mild regurgitation. Estimated peak PA pressure was 40 mmHg assuming an estimated right atrial pressure of 5-8 mmHg. PULMONIC VALVE:  There was trace regurgitation. AORTA:  The root measures 3.7 cm. The ascending aorta measures 3.6 cm.    COMPARISONS:  Prior study 2017 is not available for comparison. HISTORY: PRIOR HISTORY: CAD,CABG,CVA,Smoker    PROCEDURE: The study was performed in the [address] [place] [address],[address]. [facility]. This was a routine study. The transthoracic approach was used. The study included complete 2D imaging, M-mode, complete spectral Doppler, and color Doppler. The heart rate was  70 bpm, at the start of the study. Images were obtained from the parasternal, apical, subcostal, and suprasternal notch acoustic windows. Image quality was adequate. LEFT VENTRICLE: Size was at the upper limits of normal. Systolic function was normal. Ejection fraction was estimated to be 55 %. There were no regional wall motion abnormalities. Wall thickness was normal. DOPPLER: Doppler parameters were  consistent with abnormal left ventricular relaxation (grade 1 diastolic dysfunction). RIGHT VENTRICLE: The size was normal. Systolic function was normal.    LEFT ATRIUM: Size was normal.    RIGHT ATRIUM: Size was normal.    MITRAL VALVE: There was annular calcification. Valve structure was normal. There was normal leaflet separation. DOPPLER: The transmitral velocity was within the normal range. There was no evidence for stenosis. There was mild  regurgitation. AORTIC VALVE: The valve was trileaflet.  Leaflets exhibited mildly increased thickness, mild calcification, normal cuspal separation, and sclerosis. DOPPLER: Transaortic velocity was within the normal range. There was no evidence for  stenosis. There was no significant regurgitation. TRICUSPID VALVE: The valve structure was normal. There was normal leaflet separation. DOPPLER: The transtricuspid velocity was within the normal range. There was no evidence for stenosis. There was mild regurgitation. Estimated peak PA  pressure was 40 mmHg assuming an estimated right atrial pressure of 5-8 mmHg. PULMONIC VALVE: Not well visualized. DOPPLER: The transpulmonic velocity was within the normal range. There was trace regurgitation. PERICARDIUM: There was no pericardial effusion. AORTA: The root measures 3.7 cm. The ascending aorta measures 3.6 cm. SYSTEMIC VEINS: IVC: The inferior vena cava was normal in size. Respirophasic changes were normal.    SYSTEM MEASUREMENT TABLES    2D  %FS: 28.05 %  Ao Diam: 3.66 cm  Ao asc: 3.55 cm  EDV(Teich): 125.27 ml  EF(Teich): 53.96 %  ESV(Teich): 57.68 ml  IVSd: 0.97 cm  LA Diam: 3.32 cm  LAAs A4C: 17.23 cm2  LAESV A-L A4C: 50.93 ml  LAESV MOD A4C: 42.8 ml  LALs A4C: 4.95 cm  LVEDV MOD A4C: 75.63 ml  LVEF MOD A4C: 57.82 %  LVESV MOD A4C: 31.9 ml  LVIDd: 5.13 cm  LVIDs: 3.69 cm  LVLd A4C: 8.1 cm  LVLs A4C: 6.73 cm  LVPWd: 0.78 cm  RAAs A4C: 18.14 cm2  RAESV A-L: 53.42 ml  RAESV MOD: 54.77 ml  RALs: 5.23 cm  RVIDd: 3.47 cm  SV MOD A4C: 43.73 ml  SV(Teich): 67.6 ml    CW  TR Vmax: 2.88 m/s  TR maxP.21 mmHg    MM  TAPSE: 2.25 cm    PW  E' Av.1 m/s  E' Lat: 0.1 m/s  E' Sept: 0.09 m/s  E/E' Av.8  E/E' Lat: 7.46  E/E' Sept: 8.17  MV A Vidal: 0.73 m/s  MV Dec Long: 3.77 m/s2  MV DecT: 202.36 ms  MV E Vidal: 0.76 m/s  MV E/A Ratio: 1.04    Intersocietal Commission Accredited Echocardiography Laboratory    Prepared and electronically signed by    DO Becky  Signed 2021 21:05:08    No results found for this or any previous visit. No results found for this or any previous visit.     No results found for this or any previous visit. Echo complete with contrast if indicated  [address] [facility]  [address] [facility], [address] [facility]    Transthoracic Echocardiogram  2D, M-mode, Doppler, and Color Doppler    Study date:  2021    Patient: J Carlos Delgado  MR number: [id number]  Account number: [de-identified]  : 1945  Age: 67 years  Gender: Male  Status: Outpatient  Location: [facility]  Height: 71 in  Weight: 171.6 lb  BP: 118/ 62 mmHg    Indications: Coronary artery disease    Diagnoses: I25.10 - Atherosclerotic heart disease of native coronary artery without angina pectoris    Sonographer:  GAYLE Orozco  Primary Physician:  Flaquita Mcmanus MD  Referring Physician:  Neville Juares MD  Group:  [facility]  Interpreting Physician:  Riaz Elias DO    SUMMARY    LEFT VENTRICLE:  Size was at the upper limits of normal.  Systolic function was normal. Ejection fraction was estimated to be 55 %. There were no regional wall motion abnormalities. Doppler parameters were consistent with abnormal left ventricular relaxation (grade 1 diastolic dysfunction). RIGHT VENTRICLE:  The size was normal.  Systolic function was normal.    MITRAL VALVE:  There was mild regurgitation. TRICUSPID VALVE:  There was mild regurgitation. Estimated peak PA pressure was 40 mmHg assuming an estimated right atrial pressure of 5-8 mmHg. PULMONIC VALVE:  There was trace regurgitation. AORTA:  The root measures 3.7 cm. The ascending aorta measures 3.6 cm.    COMPARISONS:  Prior study 2017 is not available for comparison. HISTORY: PRIOR HISTORY: CAD,CABG,CVA,Smoker    PROCEDURE: The study was performed in the [address] [place] [address],[address] [place] [address]. This was a routine study. The transthoracic approach was used. The study included complete 2D imaging, M-mode, complete spectral Doppler, and color Doppler. The heart rate was  70 bpm, at the start of the study.  Images were obtained from the parasternal, apical, subcostal, and suprasternal notch acoustic windows. Image quality was adequate. LEFT VENTRICLE: Size was at the upper limits of normal. Systolic function was normal. Ejection fraction was estimated to be 55 %. There were no regional wall motion abnormalities. Wall thickness was normal. DOPPLER: Doppler parameters were  consistent with abnormal left ventricular relaxation (grade 1 diastolic dysfunction). RIGHT VENTRICLE: The size was normal. Systolic function was normal.    LEFT ATRIUM: Size was normal.    RIGHT ATRIUM: Size was normal.    MITRAL VALVE: There was annular calcification. Valve structure was normal. There was normal leaflet separation. DOPPLER: The transmitral velocity was within the normal range. There was no evidence for stenosis. There was mild  regurgitation. AORTIC VALVE: The valve was trileaflet. Leaflets exhibited mildly increased thickness, mild calcification, normal cuspal separation, and sclerosis. DOPPLER: Transaortic velocity was within the normal range. There was no evidence for  stenosis. There was no significant regurgitation. TRICUSPID VALVE: The valve structure was normal. There was normal leaflet separation. DOPPLER: The transtricuspid velocity was within the normal range. There was no evidence for stenosis. There was mild regurgitation. Estimated peak PA  pressure was 40 mmHg assuming an estimated right atrial pressure of 5-8 mmHg. PULMONIC VALVE: Not well visualized. DOPPLER: The transpulmonic velocity was within the normal range. There was trace regurgitation. PERICARDIUM: There was no pericardial effusion. AORTA: The root measures 3.7 cm. The ascending aorta measures 3.6 cm. SYSTEMIC VEINS: IVC: The inferior vena cava was normal in size.  Respirophasic changes were normal.    SYSTEM MEASUREMENT TABLES    2D  %FS: 28.05 %  Ao Diam: 3.66 cm  Ao asc: 3.55 cm  EDV(Teich): 125.27 ml  EF(Teich): 53.96 %  ESV(Teich): 57.68 ml  IVSd: 0.97 cm  LA Diam: 3.32 cm  LAAs A4C: 17.23 cm2  LAESV A-L A4C: 50.93 ml  LAESV MOD A4C: 42.8 ml  LALs A4C: 4.95 cm  LVEDV MOD A4C: 75.63 ml  LVEF MOD A4C: 57.82 %  LVESV MOD A4C: 31.9 ml  LVIDd: 5.13 cm  LVIDs: 3.69 cm  LVLd A4C: 8.1 cm  LVLs A4C: 6.73 cm  LVPWd: 0.78 cm  RAAs A4C: 18.14 cm2  RAESV A-L: 53.42 ml  RAESV MOD: 54.77 ml  RALs: 5.23 cm  RVIDd: 3.47 cm  SV MOD A4C: 43.73 ml  SV(Teich): 67.6 ml    CW  TR Vmax: 2.88 m/s  TR maxP.21 mmHg    MM  TAPSE: 2.25 cm    PW  E' Av.1 m/s  E' Lat: 0.1 m/s  E' Sept: 0.09 m/s  E/E' Av.8  E/E' Lat: 7.46  E/E' Sept: 8.17  MV A Vidal: 0.73 m/s  MV Dec Price: 3.77 m/s2  MV DecT: 202.36 ms  MV E Vidal: 0.76 m/s  MV E/A Ratio: 1.04    Inters[facility]rieetal Commission Accredited Echocardiography Laboratory    Prepared and electronically signed by    Bettye Rodriguez DO  Signed 2021 21:05:08        *-*-*-*-*-*-*-*-*-*-*-*-*-*-*-*-*-*-*-*-*-*-*-*-*-*-*-*-*-*-*-*-*-*-*-*-*-*-*-*-*-*-*-*-*-*-*-*-*-*-*-*-*-*-  SIGNATURES:   [unfilled]   [facility]     *-*-*-*-*-*-*-*-*-*-*-*-*-*-*-*-*-*-*-*-*-*-*-*-*-*-*-*-*-*-*-*-*-*-*-*-*-*-*-*-*-*-*-*-*-*-*-*-*-*-*-*-*-*-    PAST MEDICAL HISTORY:  Past Medical History:   Diagnosis Date   • AAA (abdominal aortic aneurysm) ([address])    • PVD (peripheral vascular disease) ([address])    • Tobacco abuse     PAST SURGICAL HISTORY:   Past Surgical History:   Procedure Laterality Date   • CT NEEDLE BIOPSY LUNG  2018   • CT NEEDLE BIOPSY LUNG  2017   • FEMORAL BYPASS           FAMILY HISTORY:  Family History   Problem Relation Age of Onset   • Aortic aneurysm Mother    • Coronary artery disease Mother     SOCIAL HISTORY:  Social History     Tobacco Use   Smoking Status Some Days   • Types: Cigarettes   Smokeless Tobacco Never   Tobacco Comments    passive smoke exposure      Social History     Substance and Sexual Activity   Alcohol Use Not on file     Social History     Substance and Sexual Activity   Drug Use Not on file    [unfilled] *-*-*-*-*-*-*-*-*-*-*-*-*-*-*-*-*-*-*-*-*-*-*-*-*-*-*-*-*-*-*-*-*-*-*-*-*-*-*-*-*-*-*-*-*-*-*-*-*-*-*-*-*-*  ALLERGIES:  Allergies   Allergen Reactions   • Ciprofloxacin      Dark stool , upset stomach   • Duloxetine Other (See Comments)     Cramps   • Meloxicam      Other reaction(s): itchy, blotchy, stomach upset   • Nifedipine    • Pentoxifylline      Dark stool, upset stomach   • Tramadol Other (See Comments)     Ineffective       CURRENT SCHEDULED MEDICATIONS:    Current Outpatient Medications:   •  amLODIPine (NORVASC) 10 mg tablet, TAKE ONE TABLET BY MOUTH EVERY DAY, Disp: , Rfl:   •  aspirin 81 MG tablet, Take 81 mg by mouth daily, Disp: , Rfl:   •  atorvastatin (LIPITOR) 40 mg tablet, TAKE ONE TABLET BY MOUTH EVERY DAY , Disp: 90 tablet, Rfl: 0  •  metoprolol succinate (TOPROL-XL) 25 mg 24 hr tablet, TAKE ONE TABLET BY MOUTH EVERY DAY, Disp: , Rfl:   •  Multiple Vitamins-Minerals (MULTIVITAMIN ADULT EXTRA C PO), Take 1 capsule by mouth, Disp: , Rfl:   •  Tadalafil (CIALIS PO), Take by mouth if needed, Disp: , Rfl:   •  lisinopril (ZESTRIL) 10 mg tablet, Take 10 mg by mouth daily, Disp: , Rfl:      *-*-*-*-*-*-*-*-*-*-*-*-*-*-*-*-*-*-*-*-*-*-*-*-*-*-*-*-*-*-*-*-*-*-*-*-*-*-*-*-*-*-*-*-*-*-*-*-*-*-*-*-*-*

## 2023-09-15 NOTE — PATIENT INSTRUCTIONS
CARDIOLOGY ASSESSMENT & PLAN:  1. Primary hypertension  POCT ECG      2. Coronary artery disease involving native coronary artery of native heart without angina pectoris  POCT ECG      3. Uncontrolled hypertension        4. Abdominal aneurysm (HCC)        5. Stage 3a chronic kidney disease ([address])        6. Mild pulmonary hypertension ([address])        7. CVA, old, homonymous hemianopsia        8. Tobacco dependence due to cigarettes          Coronary artery disease involving native heart without angina pectoris  Mr. Fabio Timmons is overall stable from cardiac perspective. Though his initial blood pressure was elevated repeat blood pressure check by me indicates normal blood pressure though slightly on the higher side. Unfortunately he has chronic tobacco dependence and he has not been interested in quitting smoking. Physical examination is overall unremarkable with no evidence of pulmonary or peripheral vascular congestion or signs of significant valvular heart disease. He is on good medications. ECG is overall benign. Blood work indicates elevated but stable creatinine, stage IIIb CKD, reasonably controlled diabetes and elevated triglyceride but normal LDL level. -- At this time I am advising him to continue current medications. -- I again counseled him about quitting smoking.  -- Encouraging him to avoid salty foods and monitor his blood pressures at home. -- We will plan for an 8-month office visit, earlier if necessary. -- Will repeat consider repeating echocardiogram following next visit. -- Am advising him to reestablish follow-up with his vascular doctor Dr. Jami Schaumann regarding surveillance of his history of AAA repair.

## 2023-09-26 ENCOUNTER — TELEPHONE (OUTPATIENT)
Dept: CARDIOLOGY CLINIC | Facility: CLINIC | Age: 78
End: 2023-09-26

## 2023-09-26 NOTE — TELEPHONE ENCOUNTER
Patient saw Dr Margo Lombard recently and he suggested Mis Duarte get a carotid doppler and doppler of legs bilaterally. Patient states his vascular doctor retired, Dr Megan Boggs. He would like Dr Margo Lombard to order or refer to vascular in our office.    [phone]

## 2023-09-29 DIAGNOSIS — Z95.828 STATUS POST AORTOBIFEMORAL BYPASS SURGERY: Primary | ICD-10-CM

## 2023-09-29 DIAGNOSIS — I73.9 PAD (PERIPHERAL ARTERY DISEASE) (HCC): ICD-10-CM

## 2023-09-29 NOTE — PROGRESS NOTES
Patient has history of peripheral artery disease and aortofemoral bypass in 2016. He needs to establish care with vascular surgery as his surgeon has retired.

## 2023-11-03 ENCOUNTER — CONSULT (OUTPATIENT)
Dept: VASCULAR SURGERY | Facility: CLINIC | Age: 78
End: 2023-11-03
Payer: COMMERCIAL

## 2023-11-03 VITALS
SYSTOLIC BLOOD PRESSURE: 154 MMHG | HEART RATE: 65 BPM | HEIGHT: 71 IN | DIASTOLIC BLOOD PRESSURE: 72 MMHG | OXYGEN SATURATION: 100 % | WEIGHT: 185 LBS | BODY MASS INDEX: 25.9 KG/M2

## 2023-11-03 DIAGNOSIS — I73.9 PAD (PERIPHERAL ARTERY DISEASE) (HCC): ICD-10-CM

## 2023-11-03 DIAGNOSIS — F17.210 TOBACCO DEPENDENCE DUE TO CIGARETTES: ICD-10-CM

## 2023-11-03 DIAGNOSIS — Z95.828 STATUS POST AORTOBIFEMORAL BYPASS SURGERY: Primary | ICD-10-CM

## 2023-11-03 DIAGNOSIS — I71.40 ABDOMINAL ANEURYSM (HCC): ICD-10-CM

## 2023-11-03 PROCEDURE — 99203 OFFICE O/P NEW LOW 30 MIN: CPT | Performed by: SURGERY

## 2023-11-03 NOTE — PROGRESS NOTES
Assessment/Plan:    Abdominal aneurysm [facility])  Imer Chan is a pleasant 67-year-old gentleman prior patient of Dr. Travis Quintana who had a prior aortobifemoral bypass for aneurysmal and occlusive disease. He offers no specific lower extremity complaints. He is tolerating a diet. Of note he does have history of lung cancer with prior radiation treatment. He is referred to the office to establish vascular care. Recommend baseline aortoiliac and lower extremity arterial duplex at this time. Diagnoses and all orders for this visit:    Status post aortobifemoral bypass surgery  -     Ambulatory referral to Vascular Surgery  -     VAS lower limb arterial duplex, complete bilateral; Future  -     VAS abdominal aorta/iliacs; complete study; Future    PAD (peripheral artery disease) ([address])  -     Ambulatory referral to Vascular Surgery  -     VAS lower limb arterial duplex, complete bilateral; Future  -     VAS abdominal aorta/iliacs; complete study; Future    Tobacco dependence due to cigarettes    Abdominal aneurysm (HCC)          Subjective:      Patient ID: Samir Walker is a 77 y.o. male. Patient is new to our office. He was referred here by Dr. Genoveva Denton. Patient is s/p a aorto bi-femoral bypass 2015 by Dr. Arden Almanza. Patient has had no testing since 2016. He has a history of lung cancer, upper right lobe. Patient denies pain in his legs when walking, any abd pain or any abd pain after eating. He c/o of chronic back pain. Patient is taking ASA 81 mg and Atorvastatin. Patient is a current smoker. Imer Chan is a pleasant 67-year-old gentleman who is referred to the office to establish vascular care. He is a former patient of Dr. Travis Quintana who had performed an aortobifemoral bypass for aneurysmal and occlusive disease.           The following portions of the patient's history were reviewed and updated as appropriate: allergies, current medications, past family history, past medical history, past social history, past surgical history, and problem list.    Review of Systems   Constitutional: Negative. HENT: Negative. Eyes: Negative. Respiratory: Negative. Cardiovascular: Negative. Gastrointestinal: Negative. Endocrine: Negative. Genitourinary: Negative. Musculoskeletal:  Positive for back pain. Skin: Negative. Allergic/Immunologic: Negative. Neurological: Negative. Hematological: Negative. Psychiatric/Behavioral: Negative. Objective:      /72 (BP Location: Left arm, Patient Position: Sitting, Cuff Size: Standard)   Pulse 65   Ht 5' 11" (1.803 m)   Wt 83.9 kg (185 lb)   SpO2 100%   BMI 25.80 kg/m²          Physical Exam  Constitutional:       General: He is not in acute distress. Appearance: He is well-developed. HENT:      Head: Normocephalic and atraumatic. Eyes:      General: No scleral icterus. Conjunctiva/sclera: Conjunctivae normal.   Neck:      Trachea: No tracheal deviation. Cardiovascular:      Rate and Rhythm: Normal rate and regular rhythm. Pulses:           Posterior tibial pulses are detected w/ Doppler on the right side and detected w/ Doppler on the left side. Heart sounds: Normal heart sounds. Pulmonary:      Effort: Pulmonary effort is normal.      Breath sounds: Normal breath sounds. Abdominal:      General: There is no distension. Palpations: Abdomen is soft. There is no mass (no appreciable aortic pulsation/aneurysm). Tenderness: There is no abdominal tenderness. There is no guarding or rebound. Comments: Well-healed midline laparotomy incision from prior aortobifemoral bypass   Musculoskeletal:         General: Normal range of motion. Cervical back: Normal range of motion and neck supple. Skin:     General: Skin is warm and dry. Neurological:      Mental Status: He is alert and oriented to person, place, and time.    Psychiatric:         Mood and Affect: Mood normal.         Behavior: Behavior normal.         Thought Content: Thought content normal.         Judgment: Judgment normal.           No recent vascular imaging studies available for review at today's office visit.

## 2023-11-03 NOTE — ASSESSMENT & PLAN NOTE
Edwar Mejia is a pleasant 78-year-old gentleman prior patient of Dr. Antonio Villarreal who had a prior aortobifemoral bypass for aneurysmal and occlusive disease. He offers no specific lower extremity complaints. He is tolerating a diet. Of note he does have history of lung cancer with prior radiation treatment. He is referred to the office to establish vascular care. Recommend baseline aortoiliac and lower extremity arterial duplex at this time.

## 2023-12-19 ENCOUNTER — HOSPITAL ENCOUNTER (OUTPATIENT)
Dept: NON INVASIVE DIAGNOSTICS | Facility: CLINIC | Age: 78
Discharge: HOME | End: 2023-12-19
Payer: COMMERCIAL

## 2023-12-19 DIAGNOSIS — Z95.828 STATUS POST AORTOBIFEMORAL BYPASS SURGERY: ICD-10-CM

## 2023-12-19 DIAGNOSIS — I73.9 PAD (PERIPHERAL ARTERY DISEASE) (HCC): ICD-10-CM

## 2023-12-19 PROCEDURE — 93923 UPR/LXTR ART STDY 3+ LVLS: CPT

## 2023-12-19 PROCEDURE — 93922 UPR/L XTREMITY ART 2 LEVELS: CPT | Performed by: SURGERY

## 2023-12-19 PROCEDURE — 93925 LOWER EXTREMITY STUDY: CPT

## 2023-12-19 PROCEDURE — 93978 VASCULAR STUDY: CPT | Performed by: SURGERY

## 2023-12-19 PROCEDURE — 93978 VASCULAR STUDY: CPT

## 2023-12-19 PROCEDURE — 93925 LOWER EXTREMITY STUDY: CPT | Performed by: SURGERY

## 2023-12-20 ENCOUNTER — TRANSCRIBE ORDERS (OUTPATIENT)
Dept: ADMINISTRATIVE | Facility: HOSPITAL | Age: 78
End: 2023-12-20

## 2023-12-20 DIAGNOSIS — I71.40 ABDOMINAL ANEURYSM (HCC): Primary | ICD-10-CM

## 2024-03-11 ENCOUNTER — TELEPHONE (OUTPATIENT)
Dept: VASCULAR SURGERY | Facility: CLINIC | Age: 79
End: 2024-03-11

## 2024-03-11 DIAGNOSIS — I69.398 CVA, OLD, HOMONYMOUS HEMIANOPSIA: ICD-10-CM

## 2024-03-11 DIAGNOSIS — H53.469 CVA, OLD, HOMONYMOUS HEMIANOPSIA: ICD-10-CM

## 2024-03-11 DIAGNOSIS — I71.40 ABDOMINAL ANEURYSM (HCC): ICD-10-CM

## 2024-03-11 DIAGNOSIS — I25.10 CORONARY ARTERY DISEASE INVOLVING NATIVE CORONARY ARTERY OF NATIVE HEART WITHOUT ANGINA PECTORIS: ICD-10-CM

## 2024-03-11 DIAGNOSIS — Z95.828 STATUS POST AORTOBIFEMORAL BYPASS SURGERY: ICD-10-CM

## 2024-03-11 DIAGNOSIS — I73.9 PAD (PERIPHERAL ARTERY DISEASE) (HCC): Primary | ICD-10-CM

## 2024-03-11 DIAGNOSIS — F17.210 TOBACCO DEPENDENCE DUE TO CIGARETTES: ICD-10-CM

## 2024-03-11 NOTE — TELEPHONE ENCOUNTER
"Pt called to report he experienced pain in his left calf this am when getting oob. He is also ? When he will have his carotids checked.    Pt had ov w/ Dr. Waldrop 11/13/23 and aoil/peri dopplers 12/19/24. Both dopplers were marked for letter in consensus to repeat in 12 months.    Re: calf pain, after getting oob and ambulating the pain resolved. Pt notes it occurs \"once every now and then\". Pt denies claudication or rest pain but notes he does have neuropathy.  He has numbness in all toes both feet which has been present prior to his aorto bifem bypass in 2015 by Dr. Renteria and improved greatly post op.  He denies wounds, both feet are normal temp and color.  Pt ? Why they didn't use blood pressure cuffs on his legs at doppler, advised per report they did check is roger's.  Pt then ? Why his carotids haven't been checked stating his prior VS had recommended he have them checked due to occasional lightheadedness.  Pt states he has not had them checked previously.     Advised I would route his concerns to our triage provider for recommendations.   "

## 2024-03-11 NOTE — TELEPHONE ENCOUNTER
I do not see any recent carotid duplex in the chart. Can place order to re-evaluate.     Regarding his calf cramping when getting out of bed. This does not sound vascular in etiology and is more likely musculoskeletal related. I would encourage him to try gentle calf stretches and make sure he is drinking enough water throughout the day.

## 2024-03-11 NOTE — TELEPHONE ENCOUNTER
Order placed for cv doppler, s/w pt and notified of recommendations. Pt verbalized understanding. Transf to call center to schedule cv doppler.

## 2024-04-03 ENCOUNTER — HOSPITAL ENCOUNTER (OUTPATIENT)
Dept: NON INVASIVE DIAGNOSTICS | Facility: HOSPITAL | Age: 79
Discharge: HOME | End: 2024-04-03
Payer: COMMERCIAL

## 2024-04-03 DIAGNOSIS — F17.210 TOBACCO DEPENDENCE DUE TO CIGARETTES: ICD-10-CM

## 2024-04-03 DIAGNOSIS — I25.10 CORONARY ARTERY DISEASE INVOLVING NATIVE CORONARY ARTERY OF NATIVE HEART WITHOUT ANGINA PECTORIS: ICD-10-CM

## 2024-04-03 DIAGNOSIS — I73.9 PAD (PERIPHERAL ARTERY DISEASE) (HCC): ICD-10-CM

## 2024-04-03 DIAGNOSIS — H53.469 CVA, OLD, HOMONYMOUS HEMIANOPSIA: ICD-10-CM

## 2024-04-03 DIAGNOSIS — I69.398 CVA, OLD, HOMONYMOUS HEMIANOPSIA: ICD-10-CM

## 2024-04-03 DIAGNOSIS — I71.40 ABDOMINAL ANEURYSM (HCC): ICD-10-CM

## 2024-04-03 DIAGNOSIS — Z95.828 STATUS POST AORTOBIFEMORAL BYPASS SURGERY: ICD-10-CM

## 2024-04-03 PROCEDURE — 93880 EXTRACRANIAL BILAT STUDY: CPT | Performed by: SURGERY

## 2024-04-03 PROCEDURE — 93880 EXTRACRANIAL BILAT STUDY: CPT

## 2024-04-12 ENCOUNTER — OFFICE VISIT (OUTPATIENT)
Dept: CARDIOLOGY CLINIC | Facility: CLINIC | Age: 79
End: 2024-04-12
Payer: COMMERCIAL

## 2024-04-12 VITALS
BODY MASS INDEX: 25.76 KG/M2 | SYSTOLIC BLOOD PRESSURE: 110 MMHG | HEART RATE: 66 BPM | OXYGEN SATURATION: 100 % | DIASTOLIC BLOOD PRESSURE: 60 MMHG | HEIGHT: 71 IN | WEIGHT: 184 LBS

## 2024-04-12 DIAGNOSIS — I10 PRIMARY HYPERTENSION: ICD-10-CM

## 2024-04-12 DIAGNOSIS — F17.210 TOBACCO DEPENDENCE DUE TO CIGARETTES: ICD-10-CM

## 2024-04-12 DIAGNOSIS — I50.32 CHRONIC HEART FAILURE WITH PRESERVED EJECTION FRACTION (HCC): Primary | ICD-10-CM

## 2024-04-12 DIAGNOSIS — I69.398 CVA, OLD, HOMONYMOUS HEMIANOPSIA: ICD-10-CM

## 2024-04-12 DIAGNOSIS — H53.469 CVA, OLD, HOMONYMOUS HEMIANOPSIA: ICD-10-CM

## 2024-04-12 DIAGNOSIS — I25.10 CORONARY ARTERY DISEASE INVOLVING NATIVE CORONARY ARTERY OF NATIVE HEART WITHOUT ANGINA PECTORIS: ICD-10-CM

## 2024-04-12 DIAGNOSIS — N18.31 STAGE 3A CHRONIC KIDNEY DISEASE (HCC): ICD-10-CM

## 2024-04-12 DIAGNOSIS — E11.9 TYPE 2 DIABETES MELLITUS WITHOUT COMPLICATION, WITHOUT LONG-TERM CURRENT USE OF INSULIN (HCC): ICD-10-CM

## 2024-04-12 PROBLEM — I27.20 MILD PULMONARY HYPERTENSION (HCC): Status: RESOLVED | Noted: 2021-06-28 | Resolved: 2024-04-12

## 2024-04-12 PROBLEM — I51.89 DIASTOLIC DYSFUNCTION WITHOUT HEART FAILURE: Status: RESOLVED | Noted: 2021-06-28 | Resolved: 2024-04-12

## 2024-04-12 PROCEDURE — G2211 COMPLEX E/M VISIT ADD ON: HCPCS | Performed by: INTERNAL MEDICINE

## 2024-04-12 PROCEDURE — 99214 OFFICE O/P EST MOD 30 MIN: CPT | Performed by: INTERNAL MEDICINE

## 2024-04-12 NOTE — ASSESSMENT & PLAN NOTE
Wt Readings from Last 3 Encounters:   04/12/24 83.5 kg (184 lb)   11/03/23 83.9 kg (185 lb)   09/15/23 84.2 kg (185 lb 9.6 oz)     Mr. Sim Grijalva is currently overall stable from cardiac perspective with no recent symptoms to suggest decompensated heart failure or angina.  He did have hospitalization for heart failure and pleural effusions in December January 2024.  He is currently not on any diuretic medication.  His heart systolic function is preserved.  There was no evidence of pulmonary hypertension on the last echocardiogram.  During the testing he was also noted to have Marked thyroid abnormalities with elevated TSH although I am not sure if this is currently reported.  He has no prior history of hypothyroidism.    -- At this time I am advising him to continue current medications.  -- I am requesting a follow-up BNP level and complete thyroid function tests.  This he can get it done with his next blood work before he sees his nephrologist at [facility] in the next couple of weeks.  -- Again reinforced with him the importance of quitting smoking but he has elected not to quit smoking and he understands the risk.  -- Encouraging him to restrict fluid and monitor his weight regularly and to avoid foods that are high in salt.  Some tips are provided below.  -- Dietary and medical compliance are reinforced.  -- He is advised  to report any concerning symptoms such as chest pain, shortness of breath, decline in exercise tolerance or presyncope/syncope.    The following routine measures are being advised to prevent exacerbation of congestive heart failure:     - Daily or atleast weekly checking of weight.    Notify your clinicians if greater than 2 lb is gained in 1 day or greater than 5 lb is gained in 1 wk.    - Checking for lower extremity swelling.    Examine legs for swelling (new or increase in existing swelling) and describe if swelling reaches ankle, shin, or knee.    - Monitoring for decrease in  exercise tolerance.    Check your self for unusual shortness of breath at rest, or with mild exertion, moderate exertion, or none at all.    - Monitoring for worsening shortness of breath on lying down.    Check for increase in number of pillows used at night and for increase in waking at night with shortness of breath.    - Salt/sodium restriction to less than 2 g a day.    Calculate total sodium intake in a day from nutrition labels and food charts. Understand hidden sources of salt intake.  Eliminate the salt shaker. (Remember, One teaspoon of table salt = 2,300 mg of sodium)   Avoid using garlic salt, onion salt, MSG, meat tenderizers, broth mixes, Chinese food, soy sauce, teriyaki sauce, barbeque sauce, sauerkraut, olives, pickles, pickle relish, nichols bits, and croutons.   Use fresh ingredients and/or foods with no added salt.  Try orange, lemon, lime, pineapple juice, or vinegar as a base for meat marinades or to add tart flavor.  Avoid convenience foods such as canned soups, entrees, vegetables, pasta and rice mixes, frozen dinners, instant cereal and puddings, and gravy sauce mixes.   Select frozen meals that contain around 600 mg sodium or less.  Use fresh, frozen, no-added-salt canned vegetables, low-sodium soups, and low-sodium lunchmeats.  Look for seasoning or spice blends with no salt, or try fresh herbs, onions, or garlic.    You are advised to inform us for any concerns regarding above measures.

## 2024-04-12 NOTE — PATIENT INSTRUCTIONS
CARDIOLOGY ASSESSMENT & PLAN      Diagnosis ICD-10-CM Associated Orders   1. Chronic heart failure with preserved ejection fraction (HCC)  I50.32 TSH+Free T4     B-Type Natriuretic Peptide(BNP)      2. Type 2 diabetes mellitus without complication, without long-term current use of insulin (HCC)  E11.9       3. Stage 3a chronic kidney disease (HCC)  N18.31       4. Coronary artery disease involving native coronary artery of native heart without angina pectoris  I25.10       5. Primary hypertension  I10       6. Tobacco dependence due to cigarettes  F17.210       7. CVA, old, homonymous hemianopsia  I69.398     H53.469          Chronic heart failure with preserved ejection fraction (HCC)  Wt Readings from Last 3 Encounters:   04/12/24 83.5 kg (184 lb)   11/03/23 83.9 kg (185 lb)   09/15/23 84.2 kg (185 lb 9.6 oz)     Mr. Sim Grijalva is currently overall stable from cardiac perspective with no recent symptoms to suggest decompensated heart failure or angina.  He did have hospitalization for heart failure and pleural effusions in December January 2024.  He is currently not on any diuretic medication.  His heart systolic function is preserved.  There was no evidence of pulmonary hypertension on the last echocardiogram.  During the testing he was also noted to have Marked thyroid abnormalities with elevated TSH although I am not sure if this is currently reported.  He has no prior history of hypothyroidism.    -- At this time I am advising him to continue current medications.  -- I am requesting a follow-up BNP level and complete thyroid function tests.  This he can get it done with his next blood work before he sees his nephrologist at [facility] in the next couple of weeks.  -- Again reinforced with him the importance of quitting smoking but he has elected not to quit smoking and he understands the risk.  -- Encouraging him to restrict fluid and monitor his weight regularly and to avoid foods that are high in salt.   Some tips are provided below.  -- Dietary and medical compliance are reinforced.  -- He is advised  to report any concerning symptoms such as chest pain, shortness of breath, decline in exercise tolerance or presyncope/syncope.    The following routine measures are being advised to prevent exacerbation of congestive heart failure:     - Daily or atleast weekly checking of weight.    Notify your clinicians if greater than 2 lb is gained in 1 day or greater than 5 lb is gained in 1 wk.    - Checking for lower extremity swelling.    Examine legs for swelling (new or increase in existing swelling) and describe if swelling reaches ankle, shin, or knee.    - Monitoring for decrease in exercise tolerance.    Check your self for unusual shortness of breath at rest, or with mild exertion, moderate exertion, or none at all.    - Monitoring for worsening shortness of breath on lying down.    Check for increase in number of pillows used at night and for increase in waking at night with shortness of breath.    - Salt/sodium restriction to less than 2 g a day.    Calculate total sodium intake in a day from nutrition labels and food charts. Understand hidden sources of salt intake.  Eliminate the salt shaker. (Remember, One teaspoon of table salt = 2,300 mg of sodium)   Avoid using garlic salt, onion salt, MSG, meat tenderizers, broth mixes, Chinese food, soy sauce, teriyaki sauce, barbeque sauce, sauerkraut, olives, pickles, pickle relish, nichols bits, and croutons.   Use fresh ingredients and/or foods with no added salt.  Try orange, lemon, lime, pineapple juice, or vinegar as a base for meat marinades or to add tart flavor.  Avoid convenience foods such as canned soups, entrees, vegetables, pasta and rice mixes, frozen dinners, instant cereal and puddings, and gravy sauce mixes.   Select frozen meals that contain around 600 mg sodium or less.  Use fresh, frozen, no-added-salt canned vegetables, low-sodium soups, and low-sodium  lunchmeats.  Look for seasoning or spice blends with no salt, or try fresh herbs, onions, or garlic.    You are advised to inform us for any concerns regarding above measures.

## 2024-04-12 NOTE — PROGRESS NOTES
[facility]  [facility]  Primary Office: [address] [place] [id number]  Phone: [phone]; Fax: [phone]  *-*-*-*-*-*-*-*-*-*-*-*-*-*-*-*-*-*-*-*-*-*-*-*-*-*-*-*-*-*-*-*-*-*-*-*-*-*-*-*-*-*-*-*-*-*-*-*-*-*-*-*-*-*  ENCOUNTER DATE: 04/12/24 12:00 PM  PATIENT NAME: Sim Grijalva   1945 [id number]  AGE:78 y.o.      SEX: male  ENCOUNTER PROVIDER: Josue Laureano MD, [facility], [facility]  PRIMARY CARE PHYSICIAN: Gabe Fabian MD    DIAGNOSES:   1. History of coronary artery disease status post CABG 2011   2. Abdominal aortic aneurysm repair in 2015   3. Peripheral vascular disease status post aortofemoral bypass in July 2016   4. Benign essential hypertension   5. Mixed dyslipidemia   6. Osteoarthritis and degenerative joint disease   7. History of anemia   8. Peripheral neuropathy   9. History of constipation   10. Non-small cell lung cancer involving right Lung upper lobe, stage IIIA, confirmed in December 2018, completed 7 week course of chemo (Taxol and carboplatin) and radiation- March 2019. Right hilar lymph node metastasis.   11. Bilateral inguinal hernia s/p repair Sep 2019.   12. Carotid artery disease   13. 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   12. Right chest wall Shingles 2021  13.  Chronic heart failure, heart failure with preserved ejection fraction -- hospitalization with exacerbation and December 2023    ECHOCARDIOGRAM 1/1/2024 [facility]:  Normal left ventricle size, normal wall thickness, normal left ventricular systolic function, EF 60 to 65%.  Normal right ventricle size and function.  Normal left and right atrial cavity size.  Mild posterior valve annular calcification, trace mitral valve regurgitation.  Trace tricuspid valve regurgitation.  Aortic valve sclerosis without stenosis or regurgitation.  No pulmonary hypertension.  No pericardial effusion.  Normal ascending aorta  diameter.    ECHOCARDIOGRAM June 22, 2021: Normal left ventricular size and systolic function, EF around 55%, grade 1 diastolic dysfunction, normal right ventricular size and systolic function, normal left and right atrial size, mild mitral valve regurgitation, aortic valve sclerosis with some focal calcification, no stenosis or regurgitation, mild tricuspid valve regurgitation, mild pulmonary hypertension, estimated RVSP/PASP is 40 mmHg, no obvious pericardial effusion. Borderline active see of ascending thoracic aorta measuring 3.7 cm.     17 day EVENT MONITORING in December 2019-January 2020 showed predominant sinus rhythm with no occurrence of atrial fibrillation. Supraventricular and ventricular ectopy burden was less than 1%. There was single occurrence of asymptomatic nonsustained SVT. No symptoms were reported during the period of monitoring.     PERSANTINE NUCLEAR STRESS TEST in December 2014 showed normal perfusion with no evidence of ischemia, EF 62%.     ECHOCARDIOGRAM in December 2017 showed mild concentric left ventricular hypertrophy, normal left ventricular systolic function, EF around 55-60%, grade 1 diastolic dysfunction with normal left ventricular filling pressures, trace mitral valve regurgitation, aortic valve sclerosis, mild tricuspid valve regurgitation and no pulmonary hypertension.       CURRENT ECG   No results found for this visit on 04/12/24.        CARDIOLOGY ASSESSMENT & PLAN      Diagnosis ICD-10-CM Associated Orders   1. Chronic heart failure with preserved ejection fraction ([facility])  I50.32 TSH+Free T4     B-Type Natriuretic Peptide(BNP)      2. Type 2 diabetes mellitus without complication, without long-term current use of insulin ([facility])  E11.9       3. Stage 3a chronic kidney disease ([facility])  N18.31       4. Coronary artery disease involving native coronary artery of native heart without angina pectoris  I25.10       5. Primary hypertension  I10       6. Tobacco dependence due to  cigarettes  F17.210       7. CVA, old, homonymous hemianopsia  I69.398     H53.469          Chronic heart failure with preserved ejection fraction (HCC)  Wt Readings from Last 3 Encounters:   04/12/24 83.5 kg (184 lb)   11/03/23 83.9 kg (185 lb)   09/15/23 84.2 kg (185 lb 9.6 oz)     Mr. Sim Grijalva is currently overall stable from cardiac perspective with no recent symptoms to suggest decompensated heart failure or angina.  He did have hospitalization for heart failure and pleural effusions in December January 2024.  He is currently not on any diuretic medication.  His heart systolic function is preserved.  There was no evidence of pulmonary hypertension on the last echocardiogram.  During the testing he was also noted to have Marked thyroid abnormalities with elevated TSH although I am not sure if this is currently reported.  He has no prior history of hypothyroidism.    -- At this time I am advising him to continue current medications.  -- I am requesting a follow-up BNP level and complete thyroid function tests.  This he can get it done with his next blood work before he sees his nephrologist at [facility] in the next couple of weeks.  -- Again reinforced with him the importance of quitting smoking but he has elected not to quit smoking and he understands the risk.  -- Encouraging him to restrict fluid and monitor his weight regularly and to avoid foods that are high in salt.  Some tips are provided below.  -- Dietary and medical compliance are reinforced.  -- He is advised  to report any concerning symptoms such as chest pain, shortness of breath, decline in exercise tolerance or presyncope/syncope.    The following routine measures are being advised to prevent exacerbation of congestive heart failure:     - Daily or atleast weekly checking of weight.    Notify your clinicians if greater than 2 lb is gained in 1 day or greater than 5 lb is gained in 1 wk.    - Checking for lower extremity swelling.     Examine legs for swelling (new or increase in existing swelling) and describe if swelling reaches ankle, shin, or knee.    - Monitoring for decrease in exercise tolerance.    Check your self for unusual shortness of breath at rest, or with mild exertion, moderate exertion, or none at all.    - Monitoring for worsening shortness of breath on lying down.    Check for increase in number of pillows used at night and for increase in waking at night with shortness of breath.    - Salt/sodium restriction to less than 2 g a day.    Calculate total sodium intake in a day from nutrition labels and food charts. Understand hidden sources of salt intake.  Eliminate the salt shaker. (Remember, One teaspoon of table salt = 2,300 mg of sodium)   Avoid using garlic salt, onion salt, MSG, meat tenderizers, broth mixes, Chinese food, soy sauce, teriyaki sauce, barbeque sauce, sauerkraut, olives, pickles, pickle relish, nichols bits, and croutons.   Use fresh ingredients and/or foods with no added salt.  Try orange, lemon, lime, pineapple juice, or vinegar as a base for meat marinades or to add tart flavor.  Avoid convenience foods such as canned soups, entrees, vegetables, pasta and rice mixes, frozen dinners, instant cereal and puddings, and gravy sauce mixes.   Select frozen meals that contain around 600 mg sodium or less.  Use fresh, frozen, no-added-salt canned vegetables, low-sodium soups, and low-sodium lunchmeats.  Look for seasoning or spice blends with no salt, or try fresh herbs, onions, or garlic.    You are advised to inform us for any concerns regarding above measures.                     INTERVAL HISTORY, HISTORY OF PRESENT ILLNESS & COMPREHENSIVE VISIT INFORMATION     Sim Grijalva is here for follow-up regarding his cardiac comorbidities which include: Coronary artery disease with history of CABG, history of AAA repair, peripheral artery disease, carotid artery disease, CVA and chronic tobacco dependence.   He was last  seen by September 2023.  Since last visit he was hospitalized at [facility] between 12/28/2023 and 1/3/2024.  He had presented with progressive shortness of breath.  Testing in the ER showed presence of bilateral  left greater than right pleural effusions.  He was diagnosed with decompensated heart failure.  He was diuresed and underwent thoracentesis procedure on the left side.  An echocardiogram was also performed.  He was eventually discharged home.  He mentions that he was not prescribed any diuretic upon discharge.  He recently saw his primary care physician who advised him to follow-up with cardiologist     He mentions that since his discharge from the hospital he has been overall feeling well.  He has had no recurrence of shortness of breath he was experiencing prior to his hospitalization.  He reports recently having some URI symptoms and is using a nasal spray which seems to be helping.  He denies any unusual chest pain or palpitation or dizziness or swelling in the feet.  Denies any orthopnea or PND.  Denies any presyncope or syncope.  Reports of intermittent low back pain with straining.  Continues to experience numbness in the left anterior chest relating to his thoracotomy and history of shingles.  He mentions that he remains in remission from his history of lung cancer.      continues to smoke a pack of cigarettes a day.  Denies significant alcohol use.    Current medications:  Atorvastatin 40 mg daily, amlodipine 10 mg daily, aspirin 81 mg daily, metoprolol succinate 25 mg daily, lisinopril 10 mg daily.    Blood work 1/18/2024:  Sodium 137 potassium 4.6 chloride 99 bicarb 28 BUN 28 creatinine 1.59 GFR 44  Normal liver function tests   on 12/31/2023  Serum protein electrophoresis negative for suggestion of light chain disease  .70          REVIEW OF SYSTEMS   Positive for: As noted above in HPI  Negative for: All remaining as reviewed below and in HPI.    SYSTEM SYMPTOMS  "REVIEWED:  General--weight change, fever, night sweats  Respiratory--cough, wheezing, shortness of breath, sputum production  Cardiovascular--chest pain, syncope, dyspnea on exertion, edema, decline in exercise tolerance, claudication   Gastrointestinal--persistent vomiting, diarrhea, abdominal distention, blood in stool   Muscular or skeletal--joint pain or swelling   Neurologic--headaches, syncope, abnormal movement  Hematologic--history of easy bruising and bleeding   Endocrine--thyroid enlargement, heat or cold intolerance, polyuria   Psychiatric--anxiety, depression     *-*-*-*-*-*-*-*-*-*-*-*-*-*-*-*-*-*-*-*-*-*-*-*-*-*-*-*-*-*-*-*-*-*-*-*-*-*-*-*-*-*-*-*-*-*-*-*-*-*-*-*-*-*-  VITAL SIGNS     CURRENT VITAL SIGNS:   Vitals:    04/12/24 1113   BP: 110/60   Pulse: 66   SpO2: 100%   Weight: 83.5 kg (184 lb)   Height: 5' 11\" (1.803 m)       BMI: Body mass index is 25.66 kg/m².    WEIGHTS:   Wt Readings from Last 25 Encounters:   04/12/24 83.5 kg (184 lb)   11/03/23 83.9 kg (185 lb)   09/15/23 84.2 kg (185 lb 9.6 oz)   08/11/22 86 kg (189 lb 9.6 oz)   06/28/21 80.7 kg (178 lb)   12/22/20 78 kg (172 lb)   06/22/20 75.8 kg (167 lb)   12/10/19 73 kg (161 lb)   09/04/19 76.7 kg (169 lb)   02/13/19 82 kg (180 lb 12.8 oz)        *-*-*-*-*-*-*-*-*-*-*-*-*-*-*-*-*-*-*-*-*-*-*-*-*-*-*-*-*-*-*-*-*-*-*-*-*-*-*-*-*-*-*-*-*-*-*-*-*-*-*-*-*-*-  PHYSICAL EXAM     General Appearance:    Alert, cooperative, no distress, appears stated age, tall, well-built   Head, Eyes, ENT:    No obvious abnormality, moist mucous mebranes.   Neck:   Supple, no carotid bruit. No JVD, lo obvious lymphadenoapthy   Back:     Symmetric, no curvature.   Lungs:     Respirations unlabored. Clear to auscultation bilaterally,    Chest wall:    No tenderness or deformity   Heart:    Regular rate and rhythm, S1 and S2 normal, no murmur, rub  or gallop.   Abdomen:     Soft, non-tender, No obvious masses, or organomegaly, no abdominal bruit   Extremities:   " "Extremities warm, no cyanosis or edema    Skin:   mILD venostatic changes in lower extremities. Normal skin color, texture, and turgor. No rashes or lesions   Neuro: Pt is alert and oriented time 3 with no gross motor deficits.   Psychiatric/Behavioral: Mood is normal. Behavior is normal.     *-*-*-*-*-*-*-*-*-*-*-*-*-*-*-*-*-*-*-*-*-*-*-*-*-*-*-*-*-*-*-*-*-*-*-*-*-*-*-*-*-*-*-*-*-*-*-*-*-*-*-*-*-*-  CURRENT MEDICATIONS LIST     Current Outpatient Medications:     amLODIPine (NORVASC) 10 mg tablet, TAKE ONE TABLET BY MOUTH EVERY DAY, Disp: , Rfl:     aspirin 81 MG tablet, Take 81 mg by mouth daily, Disp: , Rfl:     atorvastatin (LIPITOR) 40 mg tablet, TAKE ONE TABLET BY MOUTH EVERY DAY , Disp: 90 tablet, Rfl: 0    lisinopril (ZESTRIL) 10 mg tablet, Take 10 mg by mouth daily, Disp: , Rfl:     metoprolol succinate (TOPROL-XL) 25 mg 24 hr tablet, TAKE ONE TABLET BY MOUTH EVERY DAY, Disp: , Rfl:     Multiple Vitamins-Minerals (MULTIVITAMIN ADULT EXTRA C PO), Take 1 capsule by mouth, Disp: , Rfl:     Tadalafil (CIALIS PO), Take by mouth if needed, Disp: , Rfl:        ALLERGIES     Allergies   Allergen Reactions    Ciprofloxacin      Dark stool , upset stomach    Duloxetine Other (See Comments)     Cramps    Meloxicam      Other reaction(s): itchy, blotchy, stomach upset    Nifedipine     Pentoxifylline      Dark stool, upset stomach    Tramadol Other (See Comments)     Ineffective          *-*-*-*-*-*-*-*-*-*-*-*-*-*-*-*-*-*-*-*-*-*-*-*-*-*-*-*-*-*-*-*-*-*-*-*-*-*-*-*-*-*-*-*-*-*-*-*-*-*-*-*-*-*-  LABORATORY DATA   No results found for: \"NA\"  Potassium   Date Value Ref Range Status   01/18/2024 4.6 3.5 - 5.2 mmol/L Final   01/03/2024 4.6 3.5 - 5.2 mmol/L Final   01/02/2024 4.6 3.5 - 5.2 mmol/L Final     Chloride   Date Value Ref Range Status   01/18/2024 99 (L) 100 - 109 mmol/L Final   01/03/2024 101 100 - 109 mmol/L Final   01/02/2024 105 100 - 109 mmol/L Final     Carbon Dioxide   Date Value Ref Range Status "   01/18/2024 28 21 - 31 mmol/L Final   01/03/2024 23 21 - 31 mmol/L Final   01/02/2024 21 21 - 31 mmol/L Final     BUN   Date Value Ref Range Status   01/18/2024 28 7 - 28 mg/dL Final   01/03/2024 30 (H) 7 - 28 mg/dL Final   01/02/2024 33 (H) 7 - 28 mg/dL Final     Creatinine   Date Value Ref Range Status   01/18/2024 1.59 (H) 0.53 - 1.30 mg/dL Final   01/03/2024 1.51 (H) 0.53 - 1.30 mg/dL Final   01/02/2024 1.57 (H) 0.53 - 1.30 mg/dL Final     GFR, Calculated   Date Value Ref Range Status   06/01/2020 51 (L) >60 mL/min/1.73m2 Final     Comment:     mL/min per 1.73 square meters                                            Normal Function or Mild Renal    Disease (if clinically at risk):  >or=60  Moderately Decreased:                30-59  Severely Decreased:                  15-29  Renal Failure:                         <15                                            -American GFR: multiply reported GFR by 1.16    Please note that the eGFR is based on the CKD-EPI calculation, and is not intended to be used for drug dosing.                                            Note: Calculated GFR may not be an accurate indicator of renal function if the patient's renal function is not in a steady state.   10/14/2019 52 (L) >60 mL/min/1.73m2 Final     Comment:     mL/min per 1.73 square meters                                            Normal Function or Mild Renal    Disease (if clinically at risk):  >or=60  Moderately Decreased:                30-59  Severely Decreased:                  15-29  Renal Failure:                         <15                                            -American GFR: multiply reported GFR by 1.16    Please note that the eGFR is based on the CKD-EPI calculation, and is not intended to be used for drug dosing.                                            Note: Calculated GFR may not be an accurate indicator of renal function if the patient's renal function is not in a steady state.    07/09/2019 50 (L) >60 mL/min/1.73m2 Final     Comment:     mL/min per 1.73 square meters                                            Normal Function or Mild Renal    Disease (if clinically at risk):  >or=60  Moderately Decreased:                30-59  Severely Decreased:                  15-29  Renal Failure:                         <15                                            -American GFR: multiply reported GFR by 1.16    Please note that the eGFR is based on the CKD-EPI calculation, and is not intended to be used for drug dosing.                                            Note: Calculated GFR may not be an accurate indicator of renal function if the patient's renal function is not in a steady state.     eGFRcr   Date Value Ref Range Status   01/18/2024 44 (L) >59 Final   01/03/2024 47 (L) >59 Final   01/02/2024 44 (L) >59 Final     Calcium   Date Value Ref Range Status   01/18/2024 9.9 8.5 - 10.1 mg/dL Final   01/03/2024 8.6 8.5 - 10.1 mg/dL Final   01/02/2024 8.7 8.5 - 10.1 mg/dL Final     AST   Date Value Ref Range Status   01/18/2024 15 <41 U/L Final   12/28/2023 14 <41 U/L Final   12/19/2022 16 <41 U/L Final     ALT   Date Value Ref Range Status   01/18/2024 19 <56 U/L Final   12/28/2023 17 <56 U/L Final   12/19/2022 32 <56 U/L Final     Alkaline Phosphatase   Date Value Ref Range Status   01/18/2024 102 35 - 120 U/L Final   12/28/2023 85 35 - 120 U/L Final   12/19/2022 112 35 - 120 U/L Final     Magnesium   Date Value Ref Range Status   01/18/2024 1.9 1.4 - 2.2 mg/dL Final     Comment:       Ordering Provider: FLORIDA CELIS  Report Copied to : JUANPABLO HULL   12/29/2023 2.1 1.4 - 2.2 mg/dL Final     Platelet   Date Value Ref Range Status   11/20/2018 360 (H) 140 - 350 thou/cmm Final     Prothrombin Time   Date Value Ref Range Status   05/14/2021 13.0 12.0 - 14.6 sec Final   06/01/2020 13.9 12.0 - 14.6 sec Final     INR   Date Value Ref Range Status   05/14/2021 1.0  Final     Comment:  "    Interpretation  Suggested INR ranges for various  clinical conditions:                                           INR  Ambulatory Surgery          <1.5  Coumadinized Patients             (DVT,PE,MI or A.Fib)     2.0-3.0  Mechanical Heart Valve   2.5-3.5  Cardiogenic Embolus      2.5-3.5   06/01/2020 1.1  Final     Comment:     Interpretation  Suggested INR ranges for various  clinical conditions:                                           INR  Ambulatory Surgery          <1.5  Coumadinized Patients             (DVT,PE,MI or A.Fib)     2.0-3.0  Mechanical Heart Valve   2.5-3.5  Cardiogenic Embolus      2.5-3.5     No results found for: \"CK\", \"CKMB\", \"DIGOXIN\"  TSH   Date Value Ref Range Status   08/27/2019 2.02 0.36 - 3.74 uIU/mL Final     No results found for: \"CHOL\"   Hemoglobin A1C   Date Value Ref Range Status   07/25/2023 6.8 (H) <5.7 % Final     Comment:     Reference Range  Non-diabetic                     <5.7  Pre-diabetic                     5.7-6.4  Diabetic                         >=6.5  ADA target for diabetic control  <=7     No results found for: \"BLOODCX\", \"SPUTUMCULTUR\", \"GRAMSTAIN\", \"URINECX\", \"WOUNDCULT\", \"BODYFLUIDCUL\", \"MRSACULTURE\", \"INFLUAPCR\", \"INFLUBPCR\", \"RSVPCR\", \"LEGIONELLAUR\", \"CDIFFTOXINB\"     *-*-*-*-*-*-*-*-*-*-*-*-*-*-*-*-*-*-*-*-*-*-*-*-*-*-*-*-*-*-*-*-*-*-*-*-*-*-*-*-*-*-*-*-*-*-*-*-*-*-*-*-*-*-  SIGNATURES:   @SIG@   Josue Laureano MD; [facility]    *-*-*-*-*-*-*-*-*-*-*-*-*-*-*-*-*-*-*-*-*-*-*-*-*-*-*-*-*-*-*-*-*-*-*-*-*-*-*-*-*-*-*-*-*-*-*-*-*-*-*-*-*-*-  PAST MEDICAL HISTORY:  Past Medical History:   Diagnosis Date    AAA (abdominal aortic aneurysm) (HCC)     PVD (peripheral vascular disease) (HCC)     Tobacco abuse     PAST SURGICAL HISTORY:  Past Surgical History:   Procedure Laterality Date    CT NEEDLE BIOPSY LUNG  11/21/2018    CT NEEDLE BIOPSY LUNG  8/28/2017    FEMORAL BYPASS           FAMILY HISTORY:  Family History   Problem Relation Age of Onset    Aortic aneurysm Mother  "    Coronary artery disease Mother     SOCIAL HISTORY:  Social History     Tobacco Use   Smoking Status Some Days    Types: Cigarettes   Smokeless Tobacco Never   Tobacco Comments    passive smoke exposure      Social History     Substance and Sexual Activity   Alcohol Use Not on file     Social History     Substance and Sexual Activity   Drug Use Not on file    [unfilled]     *-*-*-*-*-*-*-*-*-*-*-*-*-*-*-*-*-*-*-*-*-*-*-*-*-*-*-*-*-*-*-*-*-*-*-*-*-*-*-*-*-*-*-*-*-*-*-*-*-*-*-*-*-*  ALLERGIES:  Allergies   Allergen Reactions    Ciprofloxacin      Dark stool , upset stomach    Duloxetine Other (See Comments)     Cramps    Meloxicam      Other reaction(s): itchy, blotchy, stomach upset    Nifedipine     Pentoxifylline      Dark stool, upset stomach    Tramadol Other (See Comments)     Ineffective       CURRENT SCHEDULED MEDICATIONS:    Current Outpatient Medications:     amLODIPine (NORVASC) 10 mg tablet, TAKE ONE TABLET BY MOUTH EVERY DAY, Disp: , Rfl:     aspirin 81 MG tablet, Take 81 mg by mouth daily, Disp: , Rfl:     atorvastatin (LIPITOR) 40 mg tablet, TAKE ONE TABLET BY MOUTH EVERY DAY , Disp: 90 tablet, Rfl: 0    lisinopril (ZESTRIL) 10 mg tablet, Take 10 mg by mouth daily, Disp: , Rfl:     metoprolol succinate (TOPROL-XL) 25 mg 24 hr tablet, TAKE ONE TABLET BY MOUTH EVERY DAY, Disp: , Rfl:     Multiple Vitamins-Minerals (MULTIVITAMIN ADULT EXTRA C PO), Take 1 capsule by mouth, Disp: , Rfl:     Tadalafil (CIALIS PO), Take by mouth if needed, Disp: , Rfl:      *-*-*-*-*-*-*-*-*-*-*-*-*-*-*-*-*-*-*-*-*-*-*-*-*-*-*-*-*-*-*-*-*-*-*-*-*-*-*-*-*-*-*-*-*-*-*-*-*-*-*-*-*-*

## 2024-04-15 LAB
BNP SERPL-MCNC: 124 PG/ML (ref 0–100)
T4 FREE SERPL-MCNC: 1.05 NG/DL (ref 0.61–1.12)

## 2024-05-06 ENCOUNTER — OFFICE VISIT (OUTPATIENT)
Dept: VASCULAR SURGERY | Facility: CLINIC | Age: 79
End: 2024-05-06
Payer: COMMERCIAL

## 2024-05-06 VITALS
WEIGHT: 185 LBS | SYSTOLIC BLOOD PRESSURE: 139 MMHG | HEART RATE: 68 BPM | HEIGHT: 71 IN | DIASTOLIC BLOOD PRESSURE: 60 MMHG | BODY MASS INDEX: 25.9 KG/M2 | OXYGEN SATURATION: 90 %

## 2024-05-06 DIAGNOSIS — I65.23 CAROTID STENOSIS, ASYMPTOMATIC, BILATERAL: Primary | ICD-10-CM

## 2024-05-06 DIAGNOSIS — F17.210 TOBACCO DEPENDENCE DUE TO CIGARETTES: ICD-10-CM

## 2024-05-06 DIAGNOSIS — I77.9 PAOD (PERIPHERAL ARTERIAL OCCLUSIVE DISEASE) (HCC): ICD-10-CM

## 2024-05-06 DIAGNOSIS — I71.40 ABDOMINAL ANEURYSM (HCC): ICD-10-CM

## 2024-05-06 PROBLEM — K55.1 MESENTERIC ARTERY STENOSIS (HCC): Status: ACTIVE | Noted: 2024-05-06

## 2024-05-06 PROCEDURE — 99214 OFFICE O/P EST MOD 30 MIN: CPT | Performed by: NURSE PRACTITIONER

## 2024-05-06 RX ORDER — MULTIVIT-MIN/IRON/FOLIC ACID/K 18-600-40
CAPSULE ORAL
COMMUNITY

## 2024-05-06 NOTE — PROGRESS NOTES
78-year-old male current smoker with past medical history CAD, HTN, AAA, CHF, PAOD, bilateral carotid stenosis, type 2 diabetes, CKD stage III, history of CVA, HLD returns to the office for review of his carotid ultrasound, he is asymptomatic.   Assessment/Plan:    Abdominal aneurysm (HCC)  S/P aorto-bi-fem bypass done by Dr. Renteria in 2015.  Reviewed AOIL from 12/19/2023 demonstrates aorto by Aorto- bi femoral bypass graft is patent.  -Denies any severe abdominal or back pain symptoms.  -Reports chronic bilateral foot neuropathy for the last 10 years.  -Denies claudication, Rest pain, wounds/tissue loss.    Carotid stenosis, asymptomatic, bilateral  Reviewed carotid ultrasound from 4/3/2024 which demonstrates right ICA occluded.  Left ICA 70 to 99% stenosis with velocities 245/48 and a ratio 2.30.    Denies symptoms of numbness/ tingling/ weakness on one side of the body, facial droop, slurred speech or blindness in one eye.   -Pt is asymptomatic, repeat carotid duplex in 3 months, if no change repeat in 3 months and return for review.      PAOD (peripheral arterial occlusive disease) ([facility])  Reviewed AOIL from 12/19/23 demonstrates   -70% stenosis at the celiac and SMA denies symptoms of mesenteric ischemia.   -Greater than 60% stenosis at the left renal artery.    -Pt denies any mesenteric ischemic symptoms such as post prandial abdominal pain, food fear or unintentional weight loss.   -BP controlled    LEAD from 12/19/2023 demonstrates   Rt: Occlusion at the SFA with reconstitution to the distal SFA.  50 to 75% stenosis at the mid popliteal artery.  Occlusion versus high-grade stenosis at the peroneal artery.  RAF: 0.70/MP 73/GTP 39  Lt:  Occlusion at SFA with reconstitution to the D SFA.  50 to 75% stenosis popliteal artery.  RAF: 0.64/MP 71/GTP 70  -Denies Claudication, rest pain, new wounds/tissue loss  -Continue with yearly non-invasive ultrasound studies, repeat AOIL already scheduled for December.  -Call the  office with new or worsening symptoms  -Continue with ASA and statin.       Tobacco dependence due to cigarettes  Tobacco use is a significant patient-modifiable risk factor for this patient’s vascular disease with multiple vascular comorbidities, and a significant risk factor for failure of and complications from any endovascular or surgical interventions.    I explained to the patient the effects of smoking including peripheral artery disease, coronary artery disease, cerebrovascular disease as well as cancer and chronic obstructive pulmonary disease. I asked the patient to stop smoking immediately. It is never too late to quit, and many studies show significant health benefits as well as economical savings after smoking cessation. I offered to the patient nicotine replacement therapy as well as referral to the smoking cessation program and access to the quit line 1-019-PCACZLX or ambulatory referral to our network smoking cessation program.    Based on our conversation, this patient does not appear ready to quit    And declined my offer of nicotine replacement or tobacco cessation medications    The patient did not set a quit date. I will continue to  follow up on this issue at our next scheduled visit.     I spent approximately 5 minutes on tobacco cessation counseling with this patient.        Diagnoses and all orders for this visit:    Carotid stenosis, asymptomatic, bilateral  -     VAS carotid complete study; Future    Abdominal aneurysm (HCC)  -     Ambulatory Referral to Vascular Surgery    PAOD (peripheral arterial occlusive disease) (HCC)    Tobacco dependence due to cigarettes    Other orders  -     Cholecalciferol (Vitamin D) 50 MCG (2000 UT) CAPS; Take by mouth Pt reports takes 4000 units          Subjective:      Patient ID: Sim Grijalva is a 78 y.o. male.    Patient presents for review of CV 4/13/24. He denies TIA/CVA symptoms. He is taking ASA 81 mg and Atorvastatin. He is a current smoker, 1 pack  per day.     78-year-old male current smoker with past medical history CAD, HTN, AAA s/p Aorto bi fem bypass (Dr Griffin, 2015), CHF, PAOD, bilateral carotid stenosis, type 2 diabetes, CKD stage III, history of CVA, HLD returns to the office for review of his carotid ultrasound, he is asymptomatic.     -Denies any new symptoms of numbness/ tingling/ weakness on one side of the body, facial droop, slurred speech or blindness in one eye. R eye is blurry in the center d/t CVA 3-4 year ago.  -Numbness and tingling in b/l feet for many years.   -Denies claudication, denies rest pain, no wounds/ tissue loss.  -Pt denies any mesenteric ischemic symptoms such as post prandial abdominal pain, food fear or unintentional weight loss.   States he has eczema on the leg f/u with dermatology.   He is compliant with ASA and atorvastatin daily.   He is currently smoking 1 ppd for the past 70 years.  He reports he is not physically active at this time, largely sedentary and does no exercise.         The following portions of the patient's history were reviewed and updated as appropriate: allergies, current medications, past family history, past medical history, past social history, past surgical history, and problem list.    Review of Systems   Constitutional: Negative.    HENT:  Positive for postnasal drip.    Eyes: Negative.  Negative for visual disturbance.   Respiratory:  Negative for shortness of breath (On exertion).    Cardiovascular: Negative.  Negative for chest pain.   Gastrointestinal: Negative.    Endocrine: Negative.    Genitourinary: Negative.    Musculoskeletal:  Positive for arthralgias and back pain.   Skin: Negative.    Allergic/Immunologic: Negative.    Neurological: Negative.  Negative for dizziness, facial asymmetry, speech difficulty, weakness, light-headedness and headaches.   Hematological: Negative.    Psychiatric/Behavioral: Negative.           Objective:      /60 (BP Location: Left arm, Patient  "Position: Sitting, Cuff Size: Standard)   Pulse 68   Ht 5' 11\" (1.803 m)   Wt 83.9 kg (185 lb)   SpO2 90%   BMI 25.80 kg/m²          Physical Exam  Vitals and nursing note reviewed.   Constitutional:       General: He is not in acute distress.     Appearance: Normal appearance. He is normal weight. He is not ill-appearing.   HENT:      Head: Normocephalic and atraumatic.   Neck:      Vascular: Carotid bruit present.   Cardiovascular:      Rate and Rhythm: Normal rate and regular rhythm.      Pulses:           Radial pulses are 1+ on the right side and 2+ on the left side.        Dorsalis pedis pulses are detected w/ Doppler on the right side and detected w/ Doppler on the left side.        Posterior tibial pulses are detected w/ Doppler on the right side and detected w/ Doppler on the left side.      Heart sounds: Normal heart sounds. No murmur heard.     Comments: Monophasic doppler signals  Pulmonary:      Effort: Pulmonary effort is normal. No respiratory distress.      Breath sounds: Normal breath sounds.   Musculoskeletal:      Right lower leg: Edema (trace) present.      Left lower leg: Edema (trace) present.   Skin:     General: Skin is warm and dry.      Findings: No erythema or rash.   Neurological:      General: No focal deficit present.      Mental Status: He is alert and oriented to person, place, and time.      Sensory: Sensory deficit (b/l feet) present.      Motor: No weakness.      Gait: Gait normal.   Psychiatric:         Mood and Affect: Mood normal.         Behavior: Behavior normal.         I have reviewed and made appropriate changes to the review of systems input by the medical assistant.    Vitals:    05/06/24 1104   BP: 139/60   BP Location: Left arm   Patient Position: Sitting   Cuff Size: Standard   Pulse: 68   SpO2: 90%   Weight: 83.9 kg (185 lb)   Height: 5' 11\" (1.803 m)       Patient Active Problem List   Diagnosis    Coronary artery disease involving native heart without angina " pectoris    Primary hypertension    Mixed hyperlipidemia    Pre-operative cardiovascular examination    CVA, old, homonymous hemianopsia    Tobacco dependence due to cigarettes    Abdominal aneurysm (HCC)    Stage 3a chronic kidney disease (HCC)    Type 2 diabetes mellitus without complication, without long-term current use of insulin (HCC)    Chronic heart failure with preserved ejection fraction (HCC)    PAOD (peripheral arterial occlusive disease) (HCC)    Carotid stenosis, asymptomatic, bilateral    Mesenteric artery stenosis (HCC)       Past Surgical History:   Procedure Laterality Date    CT NEEDLE BIOPSY LUNG  11/21/2018    CT NEEDLE BIOPSY LUNG  8/28/2017    FEMORAL BYPASS         Family History   Problem Relation Age of Onset    Aortic aneurysm Mother     Coronary artery disease Mother        Social History     Socioeconomic History    Marital status:      Spouse name: Not on file    Number of children: Not on file    Years of education: Not on file    Highest education level: Not on file   Occupational History    Not on file   Tobacco Use    Smoking status: Some Days     Types: Cigarettes    Smokeless tobacco: Never    Tobacco comments:     passive smoke exposure   Substance and Sexual Activity    Alcohol use: Not on file    Drug use: Not on file    Sexual activity: Not on file   Other Topics Concern    Not on file   Social History Narrative    Not on file     Social Determinants of Health     Financial Resource Strain: Low Risk  (7/10/2023)    Received from [facility]    Overall Financial Resource Strain (CARDIA)     Difficulty of Paying Living Expenses: Not hard at all   Food Insecurity: No Food Insecurity (7/10/2023)    Received from [facility]    Hunger Vital Sign     Worried About Running Out of Food in the Last Year: Never true     Ran Out of Food in the Last Year: Never true   Transportation Needs: No Transportation Needs (7/10/2023)    Received from [place]  [facility]    PRAPARE - Transportation     Lack of Transportation (Medical): No     Lack of Transportation (Non-Medical): No   Physical Activity: Insufficiently Active (7/10/2023)    Received from [facility]    Exercise Vital Sign     Days of Exercise per Week: 1 day     Minutes of Exercise per Session: 30 min   Stress: No Stress Concern Present (7/10/2023)    Received from [facility]    [place] [employer] - Occupational Stress Questionnaire     Feeling of Stress : Not at all   Social Connections: Socially Isolated (7/10/2023)    Received from [facility]    Social Connection and Isolation Panel [NHANES]     Frequency of Communication with Friends and Family: Never     Frequency of Social Gatherings with Friends and Family: Once a week     Attends [employer] Services: Never     Active Member of Clubs or Organizations: Yes     Attends Club or Organization Meetings: More than 4 times per year     Marital Status:    Intimate Partner Violence: Not At Risk (7/10/2023)    Received from [facility]    Humiliation, Afraid, Rape, and Kick questionnaire     Fear of Current or Ex-Partner: No     Emotionally Abused: No     Physically Abused: No     Sexually Abused: No   Housing Stability: Low Risk  (7/10/2023)    Received from [facility]    Housing Stability Vital Sign     Unable to Pay for Housing in the Last Year: No     Number of Places Lived in the Last Year: 1     Unstable Housing in the Last Year: No       Allergies   Allergen Reactions    Ciprofloxacin      Dark stool , upset stomach    Duloxetine Other (See Comments)     Cramps    Meloxicam      Other reaction(s): itchy, blotchy, stomach upset    Nifedipine     Pentoxifylline      Dark stool, upset stomach    Tramadol Other (See Comments)     Ineffective            Current Outpatient Medications:     amLODIPine (NORVASC) 10 mg tablet, TAKE ONE  TABLET BY MOUTH EVERY DAY, Disp: , Rfl:     aspirin 81 MG tablet, Take 81 mg by mouth daily, Disp: , Rfl:     atorvastatin (LIPITOR) 40 mg tablet, TAKE ONE TABLET BY MOUTH EVERY DAY , Disp: 90 tablet, Rfl: 0    Cholecalciferol (Vitamin D) 50 MCG (2000 UT) CAPS, Take by mouth Pt reports takes 4000 units, Disp: , Rfl:     lisinopril (ZESTRIL) 10 mg tablet, Take 10 mg by mouth daily Pt reports taking 5 mg, Disp: , Rfl:     metoprolol succinate (TOPROL-XL) 25 mg 24 hr tablet, TAKE ONE TABLET BY MOUTH EVERY DAY, Disp: , Rfl:     Multiple Vitamins-Minerals (MULTIVITAMIN ADULT EXTRA C PO), Take 1 capsule by mouth, Disp: , Rfl:     Tadalafil (CIALIS PO), Take by mouth if needed, Disp: , Rfl:   I have spent a total time of 30 minutes on 05/06/24 in caring for this patient including Diagnostic results, Risks and benefits of tx options, Instructions for management, Patient and family education, Importance of tx compliance, Risk factor reductions, Documenting in the medical record, Reviewing / ordering tests, medicine, procedures  , and Obtaining or reviewing history  .

## 2024-05-06 NOTE — PATIENT INSTRUCTIONS
-Complete abdominal, lower extremity, and carotid studies at indicated times and return to the office for review. Return sooner/ call the office if you experience any changes to your legs or feet such as new pain, redness,swelling, leg discoloration.    - Go to the ED/ Call 911:  - If you have any signs or symptoms of stroke such as: numbness/   tingling/weakness on one side, slurred speech or blindness in one eye.     -If you experience any of the following:  Sudden pain in your abdomen, groin, back, legs, or buttocks    Nausea and vomiting    A lump or swelling in your abdomen    Stiff abdominal muscles    Numbness or tingling in your legs    Pale, sweaty, or clammy skin    Dizziness, fainting or loss of consciousness    - Stay active. Exercise everyday. Walking is the recommended exercise, keep a log if possible.     - If you are interested in quitting use 1-800-Quit-Now. This service offers counseling, referrals to local programs and free medication. We also can offer you a referal service to a smoking cessation program offered at [facility].     -Continue to take your atorvastatin and Aspirin 81 mg daily as prescribed by your PCP.

## 2024-05-06 NOTE — ASSESSMENT & PLAN NOTE
Tobacco use is a significant patient-modifiable risk factor for this patient’s vascular disease with multiple vascular comorbidities, and a significant risk factor for failure of and complications from any endovascular or surgical interventions.    I explained to the patient the effects of smoking including peripheral artery disease, coronary artery disease, cerebrovascular disease as well as cancer and chronic obstructive pulmonary disease. I asked the patient to stop smoking immediately. It is never too late to quit, and many studies show significant health benefits as well as economical savings after smoking cessation. I offered to the patient nicotine replacement therapy as well as referral to the smoking cessation program and access to the quit line 6-393-ATLOKRH or ambulatory referral to our network smoking cessation program.    Based on our conversation, this patient does not appear ready to quit    And declined my offer of nicotine replacement or tobacco cessation medications    The patient did not set a quit date. I will continue to  follow up on this issue at our next scheduled visit.     I spent approximately 5 minutes on tobacco cessation counseling with this patient.

## 2024-05-06 NOTE — ASSESSMENT & PLAN NOTE
S/P aorto-bi-fem bypass done by Dr. Renteria in 2015.  Reviewed AOIL from 12/19/2023 demonstrates aorto by Aorto- bi femoral bypass graft is patent.  -Denies any severe abdominal or back pain symptoms.  -Reports chronic bilateral foot neuropathy for the last 10 years.  -Denies claudication, Rest pain, wounds/tissue loss.

## 2024-05-06 NOTE — ASSESSMENT & PLAN NOTE
Reviewed carotid ultrasound from 4/3/2024 which demonstrates right ICA occluded.  Left ICA 70 to 99% stenosis with velocities 245/48 and a ratio 2.30.    Denies symptoms of numbness/ tingling/ weakness on one side of the body, facial droop, slurred speech or blindness in one eye.   -Pt is asymptomatic, repeat carotid duplex in 3 months, if no change repeat in 3 months and return for review.

## 2024-05-06 NOTE — ASSESSMENT & PLAN NOTE
Reviewed AOIL from 12/19/23 demonstrates   -70% stenosis at the celiac and SMA denies symptoms of mesenteric ischemia.   -Greater than 60% stenosis at the left renal artery.    -Pt denies any mesenteric ischemic symptoms such as post prandial abdominal pain, food fear or unintentional weight loss.   -BP controlled    LEAD from 12/19/2023 demonstrates   Rt: Occlusion at the SFA with reconstitution to the distal SFA.  50 to 75% stenosis at the mid popliteal artery.  Occlusion versus high-grade stenosis at the peroneal artery.  RAF: 0.70/MP 73/GTP 39  Lt:  Occlusion at SFA with reconstitution to the D SFA.  50 to 75% stenosis popliteal artery.  RAF: 0.64/MP 71/GTP 70  -Denies Claudication, rest pain, new wounds/tissue loss  -Continue with yearly non-invasive ultrasound studies, repeat AOIL already scheduled for December.  -Call the office with new or worsening symptoms  -Continue with ASA and statin.

## 2024-07-08 ENCOUNTER — HOSPITAL ENCOUNTER (OUTPATIENT)
Dept: NON INVASIVE DIAGNOSTICS | Facility: CLINIC | Age: 79
Discharge: HOME | End: 2024-07-08
Payer: COMMERCIAL

## 2024-07-08 ENCOUNTER — TELEPHONE (OUTPATIENT)
Dept: VASCULAR SURGERY | Facility: CLINIC | Age: 79
End: 2024-07-08

## 2024-07-08 DIAGNOSIS — I65.23 CAROTID STENOSIS, ASYMPTOMATIC, BILATERAL: ICD-10-CM

## 2024-07-08 PROCEDURE — 93880 EXTRACRANIAL BILAT STUDY: CPT | Performed by: SURGERY

## 2024-07-08 PROCEDURE — 93880 EXTRACRANIAL BILAT STUDY: CPT

## 2024-07-10 ENCOUNTER — TRANSCRIBE ORDERS (OUTPATIENT)
Dept: VASCULAR SURGERY | Facility: CLINIC | Age: 79
End: 2024-07-10

## 2024-07-10 DIAGNOSIS — I65.23 CAROTID STENOSIS, ASYMPTOMATIC, BILATERAL: Primary | ICD-10-CM

## 2024-07-10 NOTE — TELEPHONE ENCOUNTER
----- Message from JULIET Mace sent at 7/8/2024  1:11 PM EDT -----  Reviewed. Please schedule for repeat carotid ultrasound in 6 months. Letter sent.   
Pt's CV is scheduled for 1/13/25  
verbal cues/nonverbal cues (demo/gestures)/1 person assist

## 2024-08-19 ENCOUNTER — OFFICE VISIT (OUTPATIENT)
Dept: CARDIOLOGY CLINIC | Facility: CLINIC | Age: 79
End: 2024-08-19
Payer: COMMERCIAL

## 2024-08-19 VITALS
SYSTOLIC BLOOD PRESSURE: 122 MMHG | HEART RATE: 66 BPM | DIASTOLIC BLOOD PRESSURE: 82 MMHG | HEIGHT: 71 IN | BODY MASS INDEX: 26.43 KG/M2 | WEIGHT: 188.8 LBS | OXYGEN SATURATION: 98 %

## 2024-08-19 DIAGNOSIS — I65.23 CAROTID STENOSIS, ASYMPTOMATIC, BILATERAL: ICD-10-CM

## 2024-08-19 DIAGNOSIS — I77.9 PAOD (PERIPHERAL ARTERIAL OCCLUSIVE DISEASE) (HCC): ICD-10-CM

## 2024-08-19 DIAGNOSIS — I25.10 CORONARY ARTERY DISEASE INVOLVING NATIVE CORONARY ARTERY OF NATIVE HEART WITHOUT ANGINA PECTORIS: Primary | ICD-10-CM

## 2024-08-19 DIAGNOSIS — N18.31 STAGE 3A CHRONIC KIDNEY DISEASE (HCC): ICD-10-CM

## 2024-08-19 DIAGNOSIS — I69.398 CVA, OLD, HOMONYMOUS HEMIANOPSIA: ICD-10-CM

## 2024-08-19 DIAGNOSIS — F17.210 TOBACCO DEPENDENCE DUE TO CIGARETTES: ICD-10-CM

## 2024-08-19 DIAGNOSIS — I10 PRIMARY HYPERTENSION: ICD-10-CM

## 2024-08-19 DIAGNOSIS — H53.469 CVA, OLD, HOMONYMOUS HEMIANOPSIA: ICD-10-CM

## 2024-08-19 DIAGNOSIS — I50.32 CHRONIC HEART FAILURE WITH PRESERVED EJECTION FRACTION (HCC): ICD-10-CM

## 2024-08-19 PROCEDURE — G2211 COMPLEX E/M VISIT ADD ON: HCPCS | Performed by: INTERNAL MEDICINE

## 2024-08-19 PROCEDURE — 99214 OFFICE O/P EST MOD 30 MIN: CPT | Performed by: INTERNAL MEDICINE

## 2024-08-19 NOTE — ASSESSMENT & PLAN NOTE
Wt Readings from Last 3 Encounters:   08/19/24 85.6 kg (188 lb 12.8 oz)   05/06/24 83.9 kg (185 lb)   04/12/24 83.5 kg (184 lb)     Mr. Sim Grijalva appears to be overall doing well with no recurrence of heart failure symptoms recently.  Weight has been more or less stable.  Physical examination does not show evidence of pulmonary or peripheral vascular congestion or signs of significant vascular heart disease.  He gives no history that suggest progression of his known carotid artery disease and PAD involving the abdominal/pelvic vessels.  Unfortunately continues to smoke.  He does follow with vascular surgery closely.  Renal function is borderline but it is stable.  He does follow with nephrologist associated with [facility].  He is on good medical regimen.    -- At this time I am advising him to continue current medications.  -- Again I urged him that his single major risk factor for future cardiovascular events is smoking and I would advise him to quit smoking.  He is not interested in smoking cessation at this time.  -- I am advising him to follow-up with primary care physician closely and regularly.  -- We can plan a cardiology office visit in 8 months however if he is doing great in 8 months and has no symptoms such as increasing shortness of breath or dizziness or palpitations headache blood pressure is well-controlled and if is not having shortness of breath with normal activity and or swelling in the lower extremities that appointment can be extended to 1 year history of 8 months.  -- Advising him to follow-up with vascular surgery as planned.

## 2024-08-19 NOTE — PATIENT INSTRUCTIONS
CARDIOLOGY ASSESSMENT & PLAN      Diagnosis ICD-10-CM Associated Orders   1. Coronary artery disease involving native coronary artery of native heart without angina pectoris  I25.10       2. PAOD (peripheral arterial occlusive disease) ([facility])  I77.9       3. Carotid stenosis, asymptomatic, bilateral  I65.23       4. Stage 3a chronic kidney disease (HCC)  N18.31       5. Tobacco dependence due to cigarettes  F17.210       6. CVA, old, homonymous hemianopsia  I69.398     H53.469       7. Primary hypertension  I10       8. Chronic heart failure with preserved ejection fraction ([facility])  I50.32          1. Coronary artery disease involving native coronary artery of native heart without angina pectoris  2. PAOD (peripheral arterial occlusive disease) ([facility])  3. Carotid stenosis, asymptomatic, bilateral  4. Stage 3a chronic kidney disease ([facility])  5. Tobacco dependence due to cigarettes  6. CVA, old, homonymous hemianopsia  7. Primary hypertension  8. Chronic heart failure with preserved ejection fraction ([facility])  Assessment & Plan:  Wt Readings from Last 3 Encounters:   08/19/24 85.6 kg (188 lb 12.8 oz)   05/06/24 83.9 kg (185 lb)   04/12/24 83.5 kg (184 lb)     Mr. Sim Grijalva appears to be overall doing well with no recurrence of heart failure symptoms recently.  Weight has been more or less stable.  Physical examination does not show evidence of pulmonary or peripheral vascular congestion or signs of significant vascular heart disease.  He gives no history that suggest progression of his known carotid artery disease and PAD involving the abdominal/pelvic vessels.  Unfortunately continues to smoke.  He does follow with vascular surgery closely.  Renal function is borderline but it is stable.  He [facility].  He is on good medical regimen.    -- At this time I am advising him to continue current medications.  -- Again I urged him that his single major risk factor for future  cardiovascular events is smoking and I would advise him to quit smoking.  He is not interested in smoking cessation at this time.  -- I am advising him to follow-up with primary care physician closely and regularly.  -- We can plan a cardiology office visit in 8 months however if he is doing great in 8 months and has no symptoms such as increasing shortness of breath or dizziness or palpitations headache blood pressure is well-controlled and if is not having shortness of breath with normal activity and or swelling in the lower extremities that appointment can be extended to 1 year history of 8 months.  -- Advising him to follow-up with vascular surgery as planned.

## 2024-08-19 NOTE — PROGRESS NOTES
[facility]  [facility]  Primary Office: [address] [place] [id number]  Phone: [phone]; Fax: [phone]  *-*-*-*-*-*-*-*-*-*-*-*-*-*-*-*-*-*-*-*-*-*-*-*-*-*-*-*-*-*-*-*-*-*-*-*-*-*-*-*-*-*-*-*-*-*-*-*-*-*-*-*-*-*  ENCOUNTER DATE: 08/19/24 11:49 AM  PATIENT NAME: Sim Grijalva   1945 [id number]  AGE:78 y.o.      SEX: adult  ENCOUNTER PROVIDER: Josue Laureano MD, A, [facility]  PRIMARY CARE PHYSICIAN: Gabe Fabian MD    DIAGNOSES:   1. History of coronary artery disease status post CABG 2011   2. Abdominal aortic aneurysm repair in 2015   3. Peripheral vascular disease status post aortofemoral bypass in July 2016   4. Benign essential hypertension   5. Mixed dyslipidemia   6. Osteoarthritis and degenerative joint disease   7. History of anemia   8. Peripheral neuropathy   9. History of constipation   10. Non-small cell lung cancer involving right Lung upper lobe, stage IIIA, confirmed in December 2018, completed 7 week course of chemo (Taxol and carboplatin) and radiation- March 2019. Right hilar lymph node metastasis.   11. Bilateral inguinal hernia s/p repair Sep 2019.   12. Carotid artery disease   13. 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   12. Right chest wall Shingles 2021  13.  Chronic heart failure, heart failure with preserved ejection fraction -- hospitalization with exacerbation and December 2023    ECHOCARDIOGRAM 1/1/2024 [facility]:  Normal left ventricle size, normal wall thickness, normal left ventricular systolic function, EF 60 to 65%.  Normal right ventricle size and function.  Normal left and right atrial cavity size.  Mild posterior valve annular calcification, trace mitral valve regurgitation.  Trace tricuspid valve regurgitation.  Aortic valve sclerosis without stenosis or regurgitation.  No pulmonary hypertension.  No pericardial effusion.  Normal ascending aorta  diameter.    ECHOCARDIOGRAM June 22, 2021: Normal left ventricular size and systolic function, EF around 55%, grade 1 diastolic dysfunction, normal right ventricular size and systolic function, normal left and right atrial size, mild mitral valve regurgitation, aortic valve sclerosis with some focal calcification, no stenosis or regurgitation, mild tricuspid valve regurgitation, mild pulmonary hypertension, estimated RVSP/PASP is 40 mmHg, no obvious pericardial effusion. Borderline active see of ascending thoracic aorta measuring 3.7 cm.     17 day EVENT MONITORING in December 2019-January 2020 showed predominant sinus rhythm with no occurrence of atrial fibrillation. Supraventricular and ventricular ectopy burden was less than 1%. There was single occurrence of asymptomatic nonsustained SVT. No symptoms were reported during the period of monitoring.     PERSANTINE NUCLEAR STRESS TEST in December 2014 showed normal perfusion with no evidence of ischemia, EF 62%.     ECHOCARDIOGRAM in December 2017 showed mild concentric left ventricular hypertrophy, normal left ventricular systolic function, EF around 55-60%, grade 1 diastolic dysfunction with normal left ventricular filling pressures, trace mitral valve regurgitation, aortic valve sclerosis, mild tricuspid valve regurgitation and no pulmonary hypertension.       CURRENT ECG   No results found for this visit on 08/19/24.     CARDIOLOGY ASSESSMENT & PLAN      Diagnosis ICD-10-CM Associated Orders   1. Coronary artery disease involving native coronary artery of native heart without angina pectoris  I25.10       2. PAOD (peripheral arterial occlusive disease) ([facility])  I77.9       3. Carotid stenosis, asymptomatic, bilateral  I65.23       4. Stage 3a chronic kidney disease ([facility])  N18.31       5. Tobacco dependence due to cigarettes  F17.210       6. CVA, old, homonymous hemianopsia  I69.398     H53.469       7. Primary hypertension  I10       8. Chronic heart failure with  preserved ejection fraction (HCC)  I50.32          1. Coronary artery disease involving native coronary artery of native heart without angina pectoris  2. PAOD (peripheral arterial occlusive disease) (HCC)  3. Carotid stenosis, asymptomatic, bilateral  4. Stage 3a chronic kidney disease (HCC)  5. Tobacco dependence due to cigarettes  6. CVA, old, homonymous hemianopsia  7. Primary hypertension  8. Chronic heart failure with preserved ejection fraction (HCC)  Assessment & Plan:  Wt Readings from Last 3 Encounters:   08/19/24 85.6 kg (188 lb 12.8 oz)   05/06/24 83.9 kg (185 lb)   04/12/24 83.5 kg (184 lb)     Mr. Sim Grijalva appears to be overall doing well with no recurrence of heart failure symptoms recently.  Weight has been more or less stable.  Physical examination does not show evidence of pulmonary or peripheral vascular congestion or signs of significant vascular heart disease.  He gives no history that suggest progression of his known carotid artery disease and PAD involving the abdominal/pelvic vessels.  Unfortunately continues to smoke.  He does follow with vascular surgery closely.  Renal function is borderline but it is stable.  [facility]  He is on good medical regimen.    -- At this time I am advising him to continue current medications.  -- Again I urged him that his single major risk factor for future cardiovascular events is smoking and I would advise him to quit smoking.  He is not interested in smoking cessation at this time.  -- I am advising him to follow-up with primary care physician closely and regularly.  -- We can plan a cardiology office visit in 8 months however if he is doing great in 8 months and has no symptoms such as increasing shortness of breath or dizziness or palpitations headache blood pressure is well-controlled and if is not having shortness of breath with normal activity and or swelling in the lower extremities that appointment can  be extended to 1 year history of 8 months.  -- Advising him to follow-up with vascular surgery as planned.         INTERVAL HISTORY, HISTORY OF PRESENT ILLNESS & COMPREHENSIVE VISIT INFORMATION     Sim Grijalva is here for follow-up regarding his cardiac comorbidities which include: Coronary artery disease with history of CABG, history of AAA repair, peripheral artery disease, carotid artery disease, CVA and chronic tobacco dependence.  He was last seen by 24.  Blood work was advised and he was advised to continue current medications.  He is here for follow-up today.  He mentions that since last visit he has had no hospitalizations or significant illnesses.  Reports that he has been overall feeling well.  Denies any unusual chest pain shortness of breath with normal activity palpitation dizziness or lightheadedness.  Reports that his previous symptoms of dizziness were less resolved after he started taking CBD Gummies.  Denies any recent falls.  Denies any bleeding but does get bruise easily.  Continues to experience paresthesias and pain in the chest wall relating to history of thoracotomy and history of shingles.  Reports being in remission from history of lung cancer.  Occasionally pinching pain when he takes a deep breath or has a cough.    Denies symptoms of numbness/ tingling/ weakness on one side of the body, facial droop, slurred speech or blindness in one eye. denies any mesenteric ischemic symptoms such as post prandial abdominal pain, food fear or unintentional weight loss.     Continues to smoke a pack of cigarettes a day.  Reports drinking alcohol occasionally .    Current medications:  Atorvastatin 40 mg daily, amlodipine 10 mg daily, aspirin 81 mg daily, metoprolol succinate 25 mg daily, lisinopril 10 mg daily.    Last recent comprehensive blood work available:   Blood work 5/13/2024 sodium 136 potassium 4.8 chloride 99 bicarb 28 BUN 25 creatinine 1.52 GFR 46 normal LFTs   on  4/15/2024  Hemoglobin A1c 6.8 in July 2023  TSH 2.02 in 2019 Free T41.05 In April 2024  Platelet count 3/16/2018    Major cardiac risk factors: Chronic tobacco dependence due to smoking (Tobacco use, Hypertension, Family history of CHD, Primary severe hypercholesterolemia, DM, multiple major and risk enhancing factors)     Any cardiac clinical risk enhancers from available data: Peripheral artery disease, history of CVA (Family history of premature CAD, patient's history of chronic kidney disease, primary hypercholesterolemia, metabolic syndrome, abnormal RAF, inflammatory condition such as RA-HIV-psoriasis, RA-HIV-Psoriasis,, pregnancy related complications such as preeclampsia or premature delivery, early menopause, high risk ethnicity such as [place] , social deprivation, physical inactivity, nonalcoholic fatty liver disease psychosocial stress, major psychiatric illness, atrial fibrillation, left ventricular hypertrophy, obstructive sleep apnea, nonalcoholic fatty liver disease).    REVIEW OF SYSTEMS   Positive for: As noted above in HPI  Negative for: All remaining as reviewed below and in HPI.    SYSTEM SYMPTOMS REVIEWED:  General--weight change, fever, night sweats  Respiratory--cough, wheezing, shortness of breath, sputum production  Cardiovascular--chest pain, syncope, dyspnea on exertion, edema, decline in exercise tolerance, claudication   Gastrointestinal--persistent vomiting, diarrhea, abdominal distention, blood in stool   Muscular or skeletal--joint pain or swelling   Neurologic--headaches, syncope, abnormal movement  Hematologic--history of easy bruising and bleeding   Endocrine--thyroid enlargement, heat or cold intolerance, polyuria   Psychiatric--anxiety, depression     *-*-*-*-*-*-*-*-*-*-*-*-*-*-*-*-*-*-*-*-*-*-*-*-*-*-*-*-*-*-*-*-*-*-*-*-*-*-*-*-*-*-*-*-*-*-*-*-*-*-*-*-*-*-  VITAL SIGNS     CURRENT VITAL SIGNS:   Vitals:    08/19/24 1131   BP: 122/82   Pulse: 66   SpO2: 98%   Weight:  "85.6 kg (188 lb 12.8 oz)   Height: 5' 11\" (1.803 m)       BMI: Body mass index is 26.33 kg/m².    WEIGHTS:   Wt Readings from Last 25 Encounters:   08/19/24 85.6 kg (188 lb 12.8 oz)   05/06/24 83.9 kg (185 lb)   04/12/24 83.5 kg (184 lb)   11/03/23 83.9 kg (185 lb)   09/15/23 84.2 kg (185 lb 9.6 oz)   08/11/22 86 kg (189 lb 9.6 oz)   06/28/21 80.7 kg (178 lb)   12/22/20 78 kg (172 lb)   06/22/20 75.8 kg (167 lb)   12/10/19 73 kg (161 lb)   09/04/19 76.7 kg (169 lb)   02/13/19 82 kg (180 lb 12.8 oz)        *-*-*-*-*-*-*-*-*-*-*-*-*-*-*-*-*-*-*-*-*-*-*-*-*-*-*-*-*-*-*-*-*-*-*-*-*-*-*-*-*-*-*-*-*-*-*-*-*-*-*-*-*-*-  PHYSICAL EXAM     General Appearance:    Alert, cooperative, no distress, appears stated age   Head, Eyes, ENT:    No obvious abnormality, moist mucous mebranes.   Neck:   Supple, no carotid bruit. No JVD, no obvious lymphadenoapthy   Back:     Symmetric, no curvature.   Lungs:     Respirations unlabored. Clear to auscultation bilaterally,    Chest wall:    No tenderness or deformity   Heart:    Regular rate and rhythm, S1 and S2 normal, no murmur, rub  or gallop.   Abdomen:     Soft, non-tender.   Extremities:   Extremities warm, no cyanosis or edema    Skin:   No venostatic changes in lower extremities.   Bruising noted on extremities.   Neuro: Pt is alert and oriented time 3 with no gross motor deficits.   Psych/ behavioral: Mood is normal. Behavior is normal.     *-*-*-*-*-*-*-*-*-*-*-*-*-*-*-*-*-*-*-*-*-*-*-*-*-*-*-*-*-*-*-*-*-*-*-*-*-*-*-*-*-*-*-*-*-*-*-*-*-*-*-*-*-*-  CURRENT MEDICATIONS LIST     Current Outpatient Medications:     amLODIPine (NORVASC) 10 mg tablet, TAKE ONE TABLET BY MOUTH EVERY DAY, Disp: , Rfl:     aspirin 81 MG tablet, Take 81 mg by mouth daily, Disp: , Rfl:     atorvastatin (LIPITOR) 40 mg tablet, TAKE ONE TABLET BY MOUTH EVERY DAY , Disp: 90 tablet, Rfl: 0    Cholecalciferol (Vitamin D) 50 MCG (2000 UT) CAPS, Take by mouth Pt reports takes 4000 units, Disp: , Rfl:     " lisinopril (ZESTRIL) 10 mg tablet, Take 10 mg by mouth daily Pt reports taking 5 mg, Disp: , Rfl:     metoprolol succinate (TOPROL-XL) 25 mg 24 hr tablet, TAKE ONE TABLET BY MOUTH EVERY DAY, Disp: , Rfl:     Multiple Vitamins-Minerals (MULTIVITAMIN ADULT EXTRA C PO), Take 1 capsule by mouth, Disp: , Rfl:     Tadalafil (CIALIS PO), Take by mouth if needed, Disp: , Rfl:        ALLERGIES     Allergies   Allergen Reactions    Ciprofloxacin      Dark stool , upset stomach    Duloxetine Other (See Comments)     Cramps    Meloxicam      Other reaction(s): itchy, blotchy, stomach upset    Nifedipine     Pentoxifylline      Dark stool, upset stomach    Tramadol Other (See Comments)     Ineffective          *-*-*-*-*-*-*-*-*-*-*-*-*-*-*-*-*-*-*-*-*-*-*-*-*-*-*-*-*-*-*-*-*-*-*-*-*-*-*-*-*-*-*-*-*-*-*-*-*-*-  SIGNATURES:   @SIG@   Josue Laureano MD; [facility]    *-*-*-*-*-*-*-*-*-*-*-*-*-*-*-*-*-*-*-*-*-*-*-*-*-*-*-*-*-*-*-*-*-*-*-*-*-*-*-*-*-*-*-*-*-*-*-*-*-*-*-*-*-*-  PAST MEDICAL HISTORY IN:  Past Medical History:   Diagnosis Date    AAA (abdominal aortic aneurysm) (HCC)     PVD (peripheral vascular disease) (HCC)     Tobacco abuse     PAST SURGICAL HISTORY:  Past Surgical History:   Procedure Laterality Date    CT NEEDLE BIOPSY LUNG  11/21/2018    CT NEEDLE BIOPSY LUNG  8/28/2017    FEMORAL BYPASS           FAMILY HISTORY:  Family History   Problem Relation Age of Onset    Aortic aneurysm Mother     Coronary artery disease Mother     SOCIAL HISTORY:  Social History     Tobacco Use   Smoking Status Some Days    Types: Cigarettes   Smokeless Tobacco Never   Tobacco Comments    passive smoke exposure      Social History     Substance and Sexual Activity   Alcohol Use Not on file     Social History     Substance and Sexual Activity   Drug Use Not on file    [unfilled]       *-*-*-*-*-*-*-*-*-*-*-*-*-*-*-*-*-*-*-*-*-*-*-*-*-*-*-*-*-*-*-*-*-*-*-*-*-*-*-*-*-*-*-*-*-*-*-*-*-*-*-*-*-*

## 2024-12-31 ENCOUNTER — HOSPITAL ENCOUNTER (OUTPATIENT)
Facility: CLINIC | Age: 79
Discharge: HOME | End: 2024-12-31
Payer: COMMERCIAL

## 2024-12-31 DIAGNOSIS — I71.40 ABDOMINAL ANEURYSM (HCC): ICD-10-CM

## 2024-12-31 PROCEDURE — 93922 UPR/L XTREMITY ART 2 LEVELS: CPT | Performed by: SURGERY

## 2024-12-31 PROCEDURE — 93925 LOWER EXTREMITY STUDY: CPT | Performed by: SURGERY

## 2024-12-31 PROCEDURE — 93923 UPR/LXTR ART STDY 3+ LVLS: CPT

## 2024-12-31 PROCEDURE — 93925 LOWER EXTREMITY STUDY: CPT

## 2024-12-31 PROCEDURE — 93978 VASCULAR STUDY: CPT

## 2024-12-31 PROCEDURE — 93978 VASCULAR STUDY: CPT | Performed by: SURGERY

## 2025-01-13 ENCOUNTER — HOSPITAL ENCOUNTER (OUTPATIENT)
Dept: NON INVASIVE DIAGNOSTICS | Facility: HOSPITAL | Age: 80
Discharge: HOME | End: 2025-01-13
Payer: COMMERCIAL

## 2025-01-13 DIAGNOSIS — I65.23 CAROTID STENOSIS, ASYMPTOMATIC, BILATERAL: ICD-10-CM

## 2025-01-13 PROCEDURE — 93880 EXTRACRANIAL BILAT STUDY: CPT

## 2025-01-13 PROCEDURE — 93880 EXTRACRANIAL BILAT STUDY: CPT | Performed by: SURGERY

## 2025-01-23 ENCOUNTER — RESULTS FOLLOW-UP (OUTPATIENT)
Dept: OTHER | Facility: HOSPITAL | Age: 80
End: 2025-01-23

## 2025-03-21 ENCOUNTER — OFFICE VISIT (OUTPATIENT)
Dept: VASCULAR SURGERY | Facility: CLINIC | Age: 80
End: 2025-03-21
Payer: COMMERCIAL

## 2025-03-21 VITALS
WEIGHT: 190 LBS | HEIGHT: 71 IN | SYSTOLIC BLOOD PRESSURE: 136 MMHG | DIASTOLIC BLOOD PRESSURE: 58 MMHG | OXYGEN SATURATION: 95 % | BODY MASS INDEX: 26.6 KG/M2 | HEART RATE: 53 BPM

## 2025-03-21 DIAGNOSIS — I71.40 ABDOMINAL ANEURYSM (HCC): ICD-10-CM

## 2025-03-21 DIAGNOSIS — F17.210 TOBACCO DEPENDENCE DUE TO CIGARETTES: ICD-10-CM

## 2025-03-21 DIAGNOSIS — E11.9 TYPE 2 DIABETES MELLITUS WITHOUT COMPLICATION, WITHOUT LONG-TERM CURRENT USE OF INSULIN (HCC): ICD-10-CM

## 2025-03-21 DIAGNOSIS — I71.43 INFRARENAL ABDOMINAL AORTIC ANEURYSM (AAA) WITHOUT RUPTURE (HCC): ICD-10-CM

## 2025-03-21 DIAGNOSIS — I77.9 PAOD (PERIPHERAL ARTERIAL OCCLUSIVE DISEASE) (HCC): ICD-10-CM

## 2025-03-21 DIAGNOSIS — I65.22 ASYMPTOMATIC STENOSIS OF LEFT CAROTID ARTERY: ICD-10-CM

## 2025-03-21 DIAGNOSIS — N18.32 STAGE 3B CHRONIC KIDNEY DISEASE (HCC): ICD-10-CM

## 2025-03-21 DIAGNOSIS — I65.23 CAROTID STENOSIS, ASYMPTOMATIC, BILATERAL: ICD-10-CM

## 2025-03-21 DIAGNOSIS — I73.9 PAD (PERIPHERAL ARTERY DISEASE) (HCC): Primary | ICD-10-CM

## 2025-03-21 DIAGNOSIS — I74.09 AORTOILIAC OCCLUSIVE DISEASE (HCC): ICD-10-CM

## 2025-03-21 PROCEDURE — 99215 OFFICE O/P EST HI 40 MIN: CPT | Performed by: SURGERY

## 2025-03-21 NOTE — ASSESSMENT & PLAN NOTE
"Sim  presents to the office to review surveillance studies.  Has history of aortobifemoral bypass for AAA/aortoiliac occlusive disease by Dr. Renteria in the past.  He continues to smoke a pack of cigarettes per day with no intentions of quitting.  He has multiple comorbidities including CKD, diabetes, coronary artery disease, congestive heart failure, chronic right ICA occlusion with an asymptomatic left 50 to 69% stenosis.    His carotid duplex does not suggest any significant changes.  He denies any focal neurologic deficits.  Continue surveillance with carotid duplex ordered for December.  He experience any signs or symptoms consistent/suggestive of stroke he should seek immediate medical attention.    He has known peripheral arterial occlusive disease with known bilateral SFA occlusion with left popliteal 50 to 75% stenosis.  Post recent duplex adjust right RAF of 0.71, left RAF 0.70  He denies any lifestyle limiting claudication.  He denies any tissue loss/rest pain.  Recommend continued surveillance.  For what is worth he was again counseled on the ill effects of continued smoking.  He was counseled that it is not too late to quit even at the age of 79.  He again has no desire to quit.  Is the only thing that \"I am enjoy in life\".  Recommend surveillance lower extremity arterial duplex which has also been ordered in December.    Continue aspirin/atorvastatin           "

## 2025-03-21 NOTE — PROGRESS NOTES
Name: Sim Grijalva      : 1945      MRN: [id number]  Encounter Provider: Sahra Hagen DO  Encounter Date: 3/21/2025   Encounter department: [facility] [place]  :  Assessment & Plan  Asymptomatic stenosis of left carotid artery    Orders:    VAS carotid complete study; Future    PAD (peripheral artery disease) ([facility])    Orders:    VAS ARTERIAL DUPLEX- LOWER LIMB BILATERAL; Future    Infrarenal abdominal aortic aneurysm (AAA) without rupture ([facility])         Aortoiliac occlusive disease ([facility])    Orders:    VAS abdominal aorta/iliac duplex; Future    Stage 3b chronic kidney disease ([facility])  Lab Results   Component Value Date    EGFR 42 (L) 2024    EGFR 49 (L) 2024    EGFR 45 (L) 2024    CREATININE 1.63 (H) 2024    CREATININE 1.45 (H) 2024    CREATININE 1.56 (H) 2024            Type 2 diabetes mellitus without complication, without long-term current use of insulin ([facility])    Lab Results   Component Value Date    HGBA1C 6.5 (H) 2024            PAOD (peripheral arterial occlusive disease) ([facility])         Carotid stenosis, asymptomatic, bilateral         Abdominal aneurysm (HCC)  Sim  presents to the office to review surveillance studies.  Has history of aortobifemoral bypass for AAA/aortoiliac occlusive disease by Dr. Renteria in the past.  He continues to smoke a pack of cigarettes per day with no intentions of quitting.  He has multiple comorbidities including CKD, diabetes, coronary artery disease, congestive heart failure, chronic right ICA occlusion with an asymptomatic left 50 to 69% stenosis.    His carotid duplex does not suggest any significant changes.  He denies any focal neurologic deficits.  Continue surveillance with carotid duplex ordered for December.  He experience any signs or symptoms consistent/suggestive of stroke he should seek immediate medical attention.    He has known peripheral arterial occlusive disease with known bilateral SFA  "occlusion with left popliteal 50 to 75% stenosis.  Post recent duplex adjust right RAF of 0.71, left RAF 0.70  He denies any lifestyle limiting claudication.  He denies any tissue loss/rest pain.  Recommend continued surveillance.  For what is worth he was again counseled on the ill effects of continued smoking.  He was counseled that it is not too late to quit even at the age of 79.  He again has no desire to quit.  Is the only thing that \"I am enjoy in life\".  Recommend surveillance lower extremity arterial duplex which has also been ordered in December.    Continue aspirin/atorvastatin           Tobacco dependence due to cigarettes             History of Present Illness   HPI  Sim Grijalva is a 79 y.o. male who presents to the office to review surveillance studies.  He offers no specific complaints aside for \"plantar fasciitis\".  He denies any focal neurologic deficits.  He did have a right eye \"stroke\".     Pt smokes 1 ppd.  History obtained from: patient    Review of Systems  Past Medical History   Past Medical History:   Diagnosis Date    AAA (abdominal aortic aneurysm) (HCC)     PVD (peripheral vascular disease) (HCC)     Tobacco abuse      Past Surgical History:   Procedure Laterality Date    CT NEEDLE BIOPSY LUNG  11/21/2018    CT NEEDLE BIOPSY LUNG  8/28/2017    FEMORAL BYPASS       Family History   Problem Relation Age of Onset    Aortic aneurysm Mother     Coronary artery disease Mother       reports that he has been smoking cigarettes. He has never used smokeless tobacco.  Current Outpatient Medications   Medication Instructions    amLODIPine (NORVASC) 10 mg tablet TAKE ONE TABLET BY MOUTH EVERY DAY    aspirin 81 mg, Daily    atorvastatin (LIPITOR) 40 mg tablet TAKE ONE TABLET BY MOUTH EVERY DAY     Cholecalciferol (Vitamin D) 50 MCG (2000 UT) CAPS Take by mouth Pt reports takes 4000 units    lisinopril (ZESTRIL) 10 mg, Daily    metoprolol succinate (TOPROL-XL) 25 mg 24 hr tablet TAKE ONE TABLET BY " "MOUTH EVERY DAY    Multiple Vitamins-Minerals (MULTIVITAMIN ADULT EXTRA C PO) 1 capsule    Tadalafil (CIALIS PO) As needed     Allergies   Allergen Reactions    Ciprofloxacin      Dark stool , upset stomach    Duloxetine Other (See Comments)     Cramps    Meloxicam      Other reaction(s): itchy, blotchy, stomach upset    Nifedipine     Pentoxifylline      Dark stool, upset stomach    Tramadol Other (See Comments)     Ineffective         Current Outpatient Medications on File Prior to Visit   Medication Sig Dispense Refill    amLODIPine (NORVASC) 10 mg tablet TAKE ONE TABLET BY MOUTH EVERY DAY      aspirin 81 MG tablet Take 81 mg by mouth daily      atorvastatin (LIPITOR) 40 mg tablet TAKE ONE TABLET BY MOUTH EVERY DAY  90 tablet 0    Cholecalciferol (Vitamin D) 50 MCG (2000 UT) CAPS Take by mouth Pt reports takes 4000 units      lisinopril (ZESTRIL) 10 mg tablet Take 10 mg by mouth daily Pt reports taking 5 mg      metoprolol succinate (TOPROL-XL) 25 mg 24 hr tablet TAKE ONE TABLET BY MOUTH EVERY DAY      Multiple Vitamins-Minerals (MULTIVITAMIN ADULT EXTRA C PO) Take 1 capsule by mouth      Tadalafil (CIALIS PO) Take by mouth if needed       No current facility-administered medications on file prior to visit.      Social History     Tobacco Use    Smoking status: Some Days     Types: Cigarettes    Smokeless tobacco: Never    Tobacco comments:     passive smoke exposure   Substance and Sexual Activity    Alcohol use: Not on file    Drug use: Not on file    Sexual activity: Not on file        Objective   /58 (BP Location: Right arm, Patient Position: Sitting)   Pulse (!) 53   Ht 5' 11\" (1.803 m)   Wt 86.2 kg (190 lb)   SpO2 95%   BMI 26.50 kg/m²      Physical Exam  Constitutional:       General: He is not in acute distress.     Appearance: He is well-developed.   HENT:      Head: Normocephalic and atraumatic.   Eyes:      General: No scleral icterus.     Conjunctiva/sclera: Conjunctivae normal.   Neck:     "  Trachea: No tracheal deviation.   Cardiovascular:      Rate and Rhythm: Normal rate and regular rhythm.      Pulses:           Radial pulses are 1+ on the right side and 1+ on the left side.        Femoral pulses are 1+ on the right side and 1+ on the left side.       Dorsalis pedis pulses are 0 on the right side and 0 on the left side.        Posterior tibial pulses are 0 on the right side and 0 on the left side.      Heart sounds: Normal heart sounds.   Pulmonary:      Effort: Pulmonary effort is normal.      Breath sounds: Normal breath sounds.   Abdominal:      General: There is no distension.      Palpations: Abdomen is soft. Mass: no appreciable aortic pulsation/aneurysm.      Tenderness: There is no abdominal tenderness.   Musculoskeletal:         General: Normal range of motion.      Cervical back: Normal range of motion and neck supple.   Skin:     General: Skin is warm and dry.   Neurological:      Mental Status: He is alert and oriented to person, place, and time.   Psychiatric:         Mood and Affect: Mood normal.         Behavior: Behavior normal.         Thought Content: Thought content normal.         Judgment: Judgment normal.       Carotid duplex:  CONCLUSION:     Impression:  RIGHT:  Dense calcified atherosclerotic disease is obscuring at the origin of the  internal carotid artery, therefore, unable to rule out an occlusion versus high  grade stenosis with minimal flow distally to the dense calcification. Moderate  external carotid artery disease.  Vertebral artery flow is antegrade. Mild to moderate subclavian artery disease.     LEFT:  There is 50-69% stenosis noted in the internal carotid artery.  Plaque is heterogenous/calcified and irregular.  Vertebral artery flow is antegrade. There is no significant subclavian artery  disease.     Compared to previous study on 7/8/2024, there is no significant change in the  disease process.  Lower extremity arterial duplex:  CONCLUSION:      Impression:  RIGHT LOWER LIMB:  Patent distal anastomosis  aorta bi-fem bypass graft.  Occlusion of the superficial femoral artery with reconstitution of flow in the  proximal popliteal artery.  There is a 50-75% stenosis in the proximal popliteal artery.  Ankle/Brachial index: 0.71 which is in the moderate disease category (Prior 0.7  )  PVR/ PPG tracings are dampened.  Metatarsal pressure of 60 mmHg  Great toe pressure of 54 mmHg, within the healing range     LEFT LOWER LIMB:  Patent distal anastomosis  aorta bi-fem bypass graft.  Occlusion of the superficial femoral artery with reconstitution of flow in the  proximal popliteal artery.  There is a 50-75% stenosis in the distal popliteal artery.  Ankle/Brachial index: 0.58 which is in the severe disease category (Prior 0.64  )  PVR/ PPG tracings are dampened.  Metatarsal pressure of 70 mmHg  Great toe pressure of 65 mmHg, within the healing range    Aortoiliac duplex:  CONCLUSION:     Impression  The aorta is patent and normal in caliber measuring 2.4 cm in its greatest  diameter.  The aorta bi-femoral bypass graft appears patent.  The celiac, superior mesenteric demonstrate a >70% stenosis.  The bilateral proximal renal arteries demonstrate a 60-99% stenosis.  Ankle/Brachial indices are: Rt - 0.71, moderate range (Prior: 0.7 ) and Lt -  0.58, severe range (Prior: 0.64 )  Great toe pressures are within the healing range.     Administrative Statements   I have spent a total time of 30 minutes in caring for this patient on the day of the visit/encounter including Diagnostic results, Prognosis, Risks and benefits of tx options, Instructions for management, Patient and family education, Importance of tx compliance, Risk factor reductions, Impressions, Counseling / Coordination of care, Documenting in the medical record, Reviewing/placing orders in the medical record (including tests, medications, and/or procedures), and Obtaining or reviewing history  .

## 2025-03-21 NOTE — ASSESSMENT & PLAN NOTE
Lab Results   Component Value Date    EGFR 42 (L) 11/18/2024    EGFR 49 (L) 09/11/2024    EGFR 45 (L) 08/28/2024    CREATININE 1.63 (H) 11/18/2024    CREATININE 1.45 (H) 09/11/2024    CREATININE 1.56 (H) 08/28/2024

## 2025-04-25 ENCOUNTER — OFFICE VISIT (OUTPATIENT)
Dept: CARDIOLOGY CLINIC | Facility: CLINIC | Age: 80
End: 2025-04-25
Payer: COMMERCIAL

## 2025-04-25 VITALS
WEIGHT: 190 LBS | BODY MASS INDEX: 26.6 KG/M2 | DIASTOLIC BLOOD PRESSURE: 50 MMHG | HEART RATE: 77 BPM | HEIGHT: 71 IN | SYSTOLIC BLOOD PRESSURE: 112 MMHG

## 2025-04-25 DIAGNOSIS — E11.29 TYPE 2 DIABETES MELLITUS WITH DIABETIC MICROALBUMINURIA, WITHOUT LONG-TERM CURRENT USE OF INSULIN (HCC): ICD-10-CM

## 2025-04-25 DIAGNOSIS — I50.32 CHRONIC HEART FAILURE WITH PRESERVED EJECTION FRACTION (HCC): ICD-10-CM

## 2025-04-25 DIAGNOSIS — R80.9 TYPE 2 DIABETES MELLITUS WITH DIABETIC MICROALBUMINURIA, WITHOUT LONG-TERM CURRENT USE OF INSULIN (HCC): ICD-10-CM

## 2025-04-25 DIAGNOSIS — I49.3 FREQUENT PVCS: ICD-10-CM

## 2025-04-25 DIAGNOSIS — I25.10 CORONARY ARTERY DISEASE INVOLVING NATIVE CORONARY ARTERY OF NATIVE HEART WITHOUT ANGINA PECTORIS: Primary | ICD-10-CM

## 2025-04-25 DIAGNOSIS — I10 PRIMARY HYPERTENSION: ICD-10-CM

## 2025-04-25 PROCEDURE — G2211 COMPLEX E/M VISIT ADD ON: HCPCS | Performed by: INTERNAL MEDICINE

## 2025-04-25 PROCEDURE — 99214 OFFICE O/P EST MOD 30 MIN: CPT | Performed by: INTERNAL MEDICINE

## 2025-04-25 PROCEDURE — 93000 ELECTROCARDIOGRAM COMPLETE: CPT | Performed by: INTERNAL MEDICINE

## 2025-04-25 NOTE — ASSESSMENT & PLAN NOTE
Wt Readings from Last 3 Encounters:   04/25/25 86.2 kg (190 lb)   03/21/25 86.2 kg (190 lb)   08/19/24 85.6 kg (188 lb 12.8 oz)   Has history of pleural effusion in the past.  Has no symptoms that suggest decompensated heart failure currently.  Weight has been overall stable.  Advised to continue current medications.  Will check BNP with next blood work        Orders:    B-Type Natriuretic Peptide(BNP); Future

## 2025-04-25 NOTE — PATIENT INSTRUCTIONS
Assessment & Plan  Coronary artery disease involving native coronary artery of native heart without angina pectoris  Has no symptoms of angina but does report some feeling of uneasiness and tremulousness.  He is noted to have frequent PVCs.  Denies palpitations.  Blood pressures well-controlled.  Diastolic blood pressure is noted to be low which is chronic for him.  Echocardiogram at [facility] in January 2024 was overall unremarkable.    He is on amlodipine + metoprolol + lisinopril.    Advising to continue current medications.  Requesting an echocardiogram to reassess cardiac structure and function in the setting of frequent PVCs and feeling of uneasiness.  Strongly urged to quit smoking as this is a single major risk factor for future cardiovascular events.  Orders:    POCT ECG    Echo complete w/ contrast if indicated; Future    Primary hypertension  Blood pressure is well-controlled.  Advised to continue current medications.  Orders:    POCT ECG    Frequent PVCs  Frequent PVCs and bigeminy pattern.  Morphology of PVC suggest possible left outflow tract origin.  Has not had recent thyroid function test.  Last electrolytes are normal.  Requesting repeat blood work including comprehensive metabolic panel, TSH, magnesium.  Requesting 48-hour Holter monitor to assess PVC burden.  For now advising to continue current medications.  Will review echocardiogram and blood work and determine need for further testing.  Orders:    Magnesium; Future    TSH+Free T4; Future    COMPREHENSIVE METABOLIC PANEL(12); Future    Holter monitor; Future    Echo complete w/ contrast if indicated; Future    Chronic heart failure with preserved ejection fraction (HCC)  Wt Readings from Last 3 Encounters:   04/25/25 86.2 kg (190 lb)   03/21/25 86.2 kg (190 lb)   08/19/24 85.6 kg (188 lb 12.8 oz)   Has history of pleural effusion in the past.  Has no symptoms that suggest decompensated heart failure currently.  Weight has been overall  stable.  Advised to continue current medications.  Will check BNP with next blood work

## 2025-04-25 NOTE — ASSESSMENT & PLAN NOTE
Blood pressure is well-controlled.  Advised to continue current medications.  Orders:    POCT ECG

## 2025-04-25 NOTE — ASSESSMENT & PLAN NOTE
Has no symptoms of angina but does report some feeling of uneasiness and tremulousness.  He is noted to have frequent PVCs.  Denies palpitations.  Blood pressures well-controlled.  Diastolic blood pressure is noted to be low which is chronic for him.  Echocardiogram at [facility] in January 2024 was overall unremarkable.    He is on amlodipine + metoprolol + lisinopril.    Advising to continue current medications.  Requesting an echocardiogram to reassess cardiac structure and function in the setting of frequent PVCs and feeling of uneasiness.  Strongly urged to quit smoking as this is a single major risk factor for future cardiovascular events.  Orders:    POCT ECG    Echo complete w/ contrast if indicated; Future

## 2025-04-25 NOTE — PROGRESS NOTES
Name: Sim Grijalva      : 1945      MRN: [id number]  Encounter Provider: Josue Laureano MD  Encounter Date: 2025   Encounter department: [facility] [place]    DIAGNOSES:   1. History of coronary artery disease status post CABG    2. Abdominal aortic aneurysm repair in    3. Peripheral vascular disease status post aortofemoral bypass in 2016   4. Benign essential hypertension   5. Mixed dyslipidemia   6. Osteoarthritis and degenerative joint disease   7. History of anemia   8. Peripheral neuropathy   9. History of constipation   10. Non-small cell lung cancer involving right Lung upper lobe, stage IIIA, confirmed in 2018, completed 7 week course of chemo (Taxol and carboplatin) and radiation- 2019. Right hilar lymph node metastasis.   11. Bilateral inguinal hernia s/p repair Sep 2019.   12. Carotid artery disease   13. 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   12. Right chest wall Shingles   13.  Chronic heart failure, heart failure with preserved ejection fraction -- hospitalization with exacerbation and 2023    ECHOCARDIOGRAM 2024 [facility]:  Normal left ventricle size, normal wall thickness, normal left ventricular systolic function, EF 60 to 65%.  Normal right ventricle size and function.  Normal left and right atrial cavity size.  Mild posterior valve annular calcification, trace mitral valve regurgitation.  Trace tricuspid valve regurgitation.  Aortic valve sclerosis without stenosis or regurgitation.  No pulmonary hypertension.  No pericardial effusion.  Normal ascending aorta diameter.    ECHOCARDIOGRAM 2021: Normal left ventricular size and systolic function, EF around 55%, grade 1 diastolic dysfunction, normal right ventricular size and systolic function, normal left and right atrial size, mild mitral valve regurgitation, aortic valve sclerosis with some focal  calcification, no stenosis or regurgitation, mild tricuspid valve regurgitation, mild pulmonary hypertension, estimated RVSP/PASP is 40 mmHg, no obvious pericardial effusion. Borderline active see of ascending thoracic aorta measuring 3.7 cm.     17 day EVENT MONITORING in December 2019-January 2020 showed predominant sinus rhythm with no occurrence of atrial fibrillation. Supraventricular and ventricular ectopy burden was less than 1%. There was single occurrence of asymptomatic nonsustained SVT. No symptoms were reported during the period of monitoring.     PERSANTINE NUCLEAR STRESS TEST in December 2014 showed normal perfusion with no evidence of ischemia, EF 62%.     ECHOCARDIOGRAM in December 2017 showed mild concentric left ventricular hypertrophy, normal left ventricular systolic function, EF around 55-60%, grade 1 diastolic dysfunction with normal left ventricular filling pressures, trace mitral valve regurgitation, aortic valve sclerosis, mild tricuspid valve regurgitation and no pulmonary hypertension.    Assessment & Plan  Coronary artery disease involving native coronary artery of native heart without angina pectoris  Has no symptoms of angina but does report some feeling of uneasiness and tremulousness.  He is noted to have frequent PVCs.  Denies palpitations.  Blood pressures well-controlled.  Diastolic blood pressure is noted to be low which is chronic for him.  Echocardiogram at [facility] in January 2024 was overall unremarkable.    He is on amlodipine + metoprolol + lisinopril.    Advising to continue current medications.  Requesting an echocardiogram to reassess cardiac structure and function in the setting of frequent PVCs and feeling of uneasiness.  Strongly urged to quit smoking as this is a single major risk factor for future cardiovascular events.  Orders:    POCT ECG    Echo complete w/ contrast if indicated; Future    Primary hypertension  Blood pressure is well-controlled.  Advised to continue  current medications.  Orders:    POCT ECG    Frequent PVCs  Frequent PVCs and bigeminy pattern.  Morphology of PVC suggest possible left outflow tract origin.  Has not had recent thyroid function test.  Last electrolytes are normal.  Requesting repeat blood work including comprehensive metabolic panel, TSH, magnesium.  Requesting 48-hour Holter monitor to assess PVC burden.  For now advising to continue current medications.  Will review echocardiogram and blood work and determine need for further testing.  Orders:    Magnesium; Future    TSH+Free T4; Future    COMPREHENSIVE METABOLIC PANEL(12); Future    Holter monitor; Future    Echo complete w/ contrast if indicated; Future    Chronic heart failure with preserved ejection fraction (HCC)  Wt Readings from Last 3 Encounters:   04/25/25 86.2 kg (190 lb)   03/21/25 86.2 kg (190 lb)   08/19/24 85.6 kg (188 lb 12.8 oz)   Has history of pleural effusion in the past.  Has no symptoms that suggest decompensated heart failure currently.  Weight has been overall stable.  Advised to continue current medications.  Will check BNP with next blood work        Orders:    B-Type Natriuretic Peptide(BNP); Future    Type 2 diabetes mellitus with diabetic microalbuminuria, without long-term current use of insulin ([facility])    Lab Results   Component Value Date    HGBA1C 6.9 (H) 03/26/2025                History of Present Illness   HPI  Sim Grijalva is a 79 y.o. male who presents  for follow-up regarding his cardiac comorbidities which include: Coronary artery disease with history of CABG, history of AAA repair, peripheral artery disease, carotid artery disease, CVA and chronic tobacco dependence.     History obtained from: patient    Since last visit he has no new hospitalizations or new diagnoses or significant illnesses.  From a symptom perspective he reports that he does not get any chest pain but he is feeling of something odd at times.  He feels jittery with some tremors of hands  and uneasiness in the stomach.  Denies shortness of breath.  Denies any presyncope/syncope or orthopnea or PND.  Denies any feeling of palpitations.  Reports joint pain and arthritis symptoms.    Functional capacity status: Good   (Excellent- >10 METs; Good: (7-10 METs); Moderate (4-7 METs); Poor (<= 4 METs)    Any chronic stressors: None   (feeling of poor health, financial problems, and social isolation etc).    Tobacco or alcohol dependence: Continues to smoke a pack of cigarettes a day.  Reports drinking alcohol occasionally .     Last recent comprehensive blood work available:   Blood work 3/26/2025 sodium 137 potassium 4.6 chloride 100 bicarb 28 BUN 26 creatinine 1.7 GFR 40  Normal liver function test  Lipid profile   HDL 31 Calc LDL 69  Hemoglobin A1c 6.9  .70 in 2023    ECG:   Results for orders placed or performed in visit on 04/25/25   POCT ECG    Narrative    Sinus rhythm with frequent premature ventricular contractions in bigeminy pattern.  Premature ventricular contractions have Left bundle branch block morphology, or inferiorly directed and have leftward axis.  QRS transition occurs in lead V2 and precedes native QRS transition.  Suspected left outflow tract PVCs.  HR 77 bpm.  Left atrial abnormality.       Current cardiac medications: Atorvastatin 40 mg daily, amlodipine 10 mg daily, aspirin 81 mg daily, metoprolol succinate 25 mg daily, lisinopril 10 mg daily.     CURRENT MEDICATIONS LIST     Current Outpatient Medications:     amLODIPine (NORVASC) 10 mg tablet, TAKE ONE TABLET BY MOUTH EVERY DAY, Disp: , Rfl:     aspirin 81 MG tablet, Take 81 mg by mouth daily, Disp: , Rfl:     atorvastatin (LIPITOR) 40 mg tablet, TAKE ONE TABLET BY MOUTH EVERY DAY , Disp: 90 tablet, Rfl: 0    Cholecalciferol (Vitamin D) 50 MCG (2000 UT) CAPS, Take by mouth Pt reports takes 4000 units, Disp: , Rfl:     lisinopril (ZESTRIL) 10 mg tablet, Take 10 mg by mouth daily Pt reports taking 5 mg, Disp: ,  "Rfl:     metoprolol succinate (TOPROL-XL) 25 mg 24 hr tablet, TAKE ONE TABLET BY MOUTH EVERY DAY, Disp: , Rfl:     Multiple Vitamins-Minerals (MULTIVITAMIN ADULT EXTRA C PO), Take 1 capsule by mouth, Disp: , Rfl:     Tadalafil (CIALIS PO), Take by mouth if needed, Disp: , Rfl:        ALLERGIES     Allergies   Allergen Reactions    Ciprofloxacin      Dark stool , upset stomach    Duloxetine Other (See Comments)     Cramps    Meloxicam      Other reaction(s): itchy, blotchy, stomach upset    Nifedipine     Pentoxifylline      Dark stool, upset stomach    Tramadol Other (See Comments)     Ineffective          *-*-*-*-*-*-*-*-*-*-*-*-*-*-*-*-*-*-*-*-*-*-*-*-*-*-*-*-*-*-*-*-*-*-*-*-*-*-*-*-*-*-*-*-*-*-*-*-*-*-*-*-*-*-    Review of Systems   Constitutional: Negative.    HENT: Negative.     Respiratory: Negative.     Cardiovascular: Negative.    Gastrointestinal:  Positive for abdominal pain.   Endocrine: Negative.    Genitourinary: Negative.    Musculoskeletal:  Positive for arthralgias and myalgias.   Skin: Negative.    Allergic/Immunologic: Negative.    Neurological:  Positive for tremors.   Hematological: Negative.    Psychiatric/Behavioral: Negative.     All other systems reviewed and are negative.         Objective   /50   Pulse 77   Ht 5' 11\" (1.803 m)   Wt 86.2 kg (190 lb)   BMI 26.50 kg/m²      Physical Exam  Vitals reviewed.   Constitutional:       Appearance: Normal appearance.   HENT:      Mouth/Throat:      Mouth: Mucous membranes are moist.   Cardiovascular:      Rate and Rhythm: Normal rate. Rhythm irregular.      Heart sounds: No murmur heard.     Comments: Premature beats are noted.  Distant heart sounds.  Pulmonary:      Effort: Pulmonary effort is normal.      Breath sounds: No wheezing, rhonchi or rales.   Abdominal:      Palpations: Abdomen is soft.   Musculoskeletal:      Right lower leg: No edema.      Left lower leg: No edema.   Skin:     Findings: No rash.   Neurological:      Mental " Status: He is alert and oriented to person, place, and time.   Psychiatric:         Mood and Affect: Mood normal.         Behavior: Behavior normal.

## 2025-04-29 ENCOUNTER — TELEPHONE (OUTPATIENT)
Age: 80
End: 2025-04-29

## 2025-04-29 NOTE — TELEPHONE ENCOUNTER
"Pt called to find out what the difference was between the zio monitor and the \"holter monitor\" that was ordered for him. Advised the patient on how it was applied, length of time and how returned. PT's questions were answered   "

## 2025-05-01 LAB
ALBUMIN SERPL-MCNC: 4.2 G/DL (ref 3.5–5.7)
ALP SERPL-CCNC: 83 U/L (ref 35–120)
ALT SERPL-CCNC: 18 U/L
ANION GAP SERPL CALCULATED.3IONS-SCNC: 10 MMOL/L (ref 3–11)
AST SERPL-CCNC: 12 U/L
BILIRUB SERPL-MCNC: 0.8 MG/DL (ref 0.2–1)
BUN SERPL-MCNC: 33 MG/DL (ref 7–28)
CALCIUM SERPL-MCNC: 9.2 MG/DL (ref 8.5–10.5)
CHLORIDE SERPL-SCNC: 99 MMOL/L (ref 100–109)
CO2 SERPL-SCNC: 26 MMOL/L (ref 21–31)
CREAT SERPL-MCNC: 1.79 MG/DL (ref 0.53–1.3)
CYTOLOGY CMNT CVX/VAG CYTO-IMP: ABNORMAL
GFR/BSA.PRED SERPLBLD CYS-BASED-ARV: 38 ML/MIN/{1.73_M2}
GLUCOSE SERPL-MCNC: 136 MG/DL (ref 65–99)
MAGNESIUM SERPL-MCNC: 2.1 MG/DL (ref 1.4–2.2)
POTASSIUM SERPL-SCNC: 5 MMOL/L (ref 3.5–5.2)
PROT SERPL-MCNC: 7.1 G/DL (ref 6.3–8.3)
SODIUM SERPL-SCNC: 135 MMOL/L (ref 135–145)
T4 FREE SERPL-MCNC: 1.06 NG/DL (ref 0.61–1.12)
TSH SERPL-ACNC: 4.5 UIU/ML (ref 0.45–5.33)

## 2025-05-08 ENCOUNTER — HOSPITAL ENCOUNTER (OUTPATIENT)
Dept: NON INVASIVE DIAGNOSTICS | Facility: HOSPITAL | Age: 80
Discharge: HOME | End: 2025-05-08
Attending: INTERNAL MEDICINE
Payer: COMMERCIAL

## 2025-05-08 VITALS
BODY MASS INDEX: 26.6 KG/M2 | DIASTOLIC BLOOD PRESSURE: 50 MMHG | WEIGHT: 190.04 LBS | SYSTOLIC BLOOD PRESSURE: 112 MMHG | HEIGHT: 71 IN | HEART RATE: 77 BPM

## 2025-05-08 DIAGNOSIS — I25.10 CORONARY ARTERY DISEASE INVOLVING NATIVE CORONARY ARTERY OF NATIVE HEART WITHOUT ANGINA PECTORIS: ICD-10-CM

## 2025-05-08 DIAGNOSIS — I49.3 FREQUENT PVCS: ICD-10-CM

## 2025-05-08 LAB
AORTIC ROOT: 3.6 CM
BSA FOR ECHO PROCEDURE: 2.06 M2
DOP CALC LVOT AREA: 3.14 CM2
DOP CALC LVOT DIAMETER: 2 CM
E WAVE DECELERATION TIME: 233 MS
E/A RATIO: 0.85
FRACTIONAL SHORTENING: 38 (ref 28–44)
INTERVENTRICULAR SEPTUM IN DIASTOLE (PARASTERNAL SHORT AXIS VIEW): 1.3 CM
INTERVENTRICULAR SEPTUM: 1.3 CM (ref 0.6–1.1)
LAAS-AP2: 17.6 CM2
LAAS-AP4: 15.5 CM2
LEFT ATRIUM SIZE: 2.8 CM
LEFT ATRIUM VOLUME (MOD BIPLANE): 48 ML
LEFT ATRIUM VOLUME INDEX (MOD BIPLANE): 23.3 ML/M2
LEFT INTERNAL DIMENSION IN SYSTOLE: 2.5 CM (ref 2.1–4)
LEFT VENTRICULAR INTERNAL DIMENSION IN DIASTOLE: 4 CM (ref 3.5–6)
LEFT VENTRICULAR POSTERIOR WALL IN END DIASTOLE: 1.3 CM
LEFT VENTRICULAR STROKE VOLUME: 48 ML
LV EF US.2D.A4C+ESTIMATED: 50 %
LVSV (TEICH): 48 ML
MV E'TISSUE VEL-LAT: 8 CM/S
MV E'TISSUE VEL-SEP: 6 CM/S
MV PEAK A VEL: 0.73 M/S
MV PEAK E VEL: 62 CM/S
RA PRESSURE ESTIMATED: 5 MMHG
RIGHT ATRIUM AREA SYSTOLE A4C: 12.2 CM2
RIGHT VENTRICLE ID DIMENSION: 3.4 CM
RV PSP: 40 MMHG
SL CV LEFT ATRIUM LENGTH A2C: 5.5 CM
SL CV LV EF: 55
SL CV PED ECHO LEFT VENTRICLE DIASTOLIC VOLUME (MOD BIPLANE) 2D: 70 ML
SL CV PED ECHO LEFT VENTRICLE SYSTOLIC VOLUME (MOD BIPLANE) 2D: 22 ML
TR MAX PG: 35 MMHG
TR PEAK VELOCITY: 3 M/S
TRICUSPID ANNULAR PLANE SYSTOLIC EXCURSION: 1.8 CM
TRICUSPID VALVE PEAK REGURGITATION VELOCITY: 2.95 M/S

## 2025-05-08 PROCEDURE — 93226 XTRNL ECG REC<48 HR SCAN A/R: CPT

## 2025-05-08 PROCEDURE — 93306 TTE W/DOPPLER COMPLETE: CPT

## 2025-05-08 PROCEDURE — 93225 XTRNL ECG REC<48 HRS REC: CPT

## 2025-05-08 PROCEDURE — 93306 TTE W/DOPPLER COMPLETE: CPT | Performed by: STUDENT IN AN ORGANIZED HEALTH CARE EDUCATION/TRAINING PROGRAM

## 2025-06-29 ENCOUNTER — RESULTS FOLLOW-UP (OUTPATIENT)
Dept: CARDIOLOGY CLINIC | Facility: CLINIC | Age: 80
End: 2025-06-29

## 2025-06-30 NOTE — TELEPHONE ENCOUNTER
----- Message from Josue Laureano MD sent at 6/29/2025  6:45 PM EDT -----  Needs followup with Kidney doctor if heis not already following. Kidney function is borderline low.  ----- Message -----  From: Rod Moran Orders/Results  Sent: 5/1/2025   4:59 PM EDT  To: Josue Laureano MD

## 2025-06-30 NOTE — TELEPHONE ENCOUNTER
Contact was made with Shadi Rudi and he is now aware of his results per Dr. Laureano. Patient verbally states that he understands and no questions or concerns at this time. The patient states that he does have an upcoming appointment with his kidney doctor.

## 2025-07-28 ENCOUNTER — APPOINTMENT (OUTPATIENT)
Dept: LAB | Facility: CLINIC | Age: 80
End: 2025-07-28
Payer: COMMERCIAL

## 2025-07-28 DIAGNOSIS — I49.3 FREQUENT PVCS: ICD-10-CM

## 2025-07-28 DIAGNOSIS — I50.32 CHRONIC HEART FAILURE WITH PRESERVED EJECTION FRACTION (HCC): ICD-10-CM

## 2025-07-28 LAB
BNP SERPL-MCNC: 115 PG/ML (ref 0–100)
MAGNESIUM SERPL-MCNC: 2.1 MG/DL (ref 1.9–2.7)

## 2025-07-28 PROCEDURE — 83735 ASSAY OF MAGNESIUM: CPT

## 2025-07-28 PROCEDURE — 83880 ASSAY OF NATRIURETIC PEPTIDE: CPT

## 2025-07-28 PROCEDURE — 36415 COLL VENOUS BLD VENIPUNCTURE: CPT

## 2025-08-05 ENCOUNTER — NURSE TRIAGE (OUTPATIENT)
Age: 80
End: 2025-08-05